# Patient Record
Sex: FEMALE | Race: WHITE | NOT HISPANIC OR LATINO | Employment: UNEMPLOYED | URBAN - METROPOLITAN AREA
[De-identification: names, ages, dates, MRNs, and addresses within clinical notes are randomized per-mention and may not be internally consistent; named-entity substitution may affect disease eponyms.]

---

## 2017-08-21 ENCOUNTER — OFFICE VISIT - HEALTHEAST (OUTPATIENT)
Dept: FAMILY MEDICINE | Facility: CLINIC | Age: 42
End: 2017-08-21

## 2017-08-21 ENCOUNTER — COMMUNICATION - HEALTHEAST (OUTPATIENT)
Dept: SCHEDULING | Facility: CLINIC | Age: 42
End: 2017-08-21

## 2017-08-21 DIAGNOSIS — R07.89 LEFT-SIDED CHEST WALL PAIN: ICD-10-CM

## 2017-08-21 DIAGNOSIS — M94.0 COSTOCHONDRITIS: ICD-10-CM

## 2017-08-21 DIAGNOSIS — S29.011A PECTORALIS MUSCLE STRAIN: ICD-10-CM

## 2017-08-22 LAB
ATRIAL RATE - MUSE: 79 BPM
DIASTOLIC BLOOD PRESSURE - MUSE: NORMAL MMHG
INTERPRETATION ECG - MUSE: NORMAL
P AXIS - MUSE: 60 DEGREES
PR INTERVAL - MUSE: 148 MS
QRS DURATION - MUSE: 90 MS
QT - MUSE: 402 MS
QTC - MUSE: 460 MS
R AXIS - MUSE: 16 DEGREES
SYSTOLIC BLOOD PRESSURE - MUSE: NORMAL MMHG
T AXIS - MUSE: 44 DEGREES
VENTRICULAR RATE- MUSE: 79 BPM

## 2017-10-11 ENCOUNTER — OFFICE VISIT - HEALTHEAST (OUTPATIENT)
Dept: FAMILY MEDICINE | Facility: CLINIC | Age: 42
End: 2017-10-11

## 2017-10-11 DIAGNOSIS — R05.9 COUGH: ICD-10-CM

## 2017-10-11 DIAGNOSIS — J32.9 SINUSITIS: ICD-10-CM

## 2017-10-11 DIAGNOSIS — J18.9 RIGHT MIDDLE LOBE PNEUMONIA: ICD-10-CM

## 2017-11-14 ENCOUNTER — OFFICE VISIT - HEALTHEAST (OUTPATIENT)
Dept: FAMILY MEDICINE | Facility: CLINIC | Age: 42
End: 2017-11-14

## 2017-11-14 DIAGNOSIS — R09.81 SINUS CONGESTION: ICD-10-CM

## 2017-11-14 DIAGNOSIS — R20.0 SADDLE ANESTHESIA: ICD-10-CM

## 2017-11-14 DIAGNOSIS — T14.8XXA MUSCLE STRAIN: ICD-10-CM

## 2017-11-14 DIAGNOSIS — R35.0 URINARY FREQUENCY: ICD-10-CM

## 2017-11-28 ENCOUNTER — OFFICE VISIT - HEALTHEAST (OUTPATIENT)
Dept: FAMILY MEDICINE | Facility: CLINIC | Age: 42
End: 2017-11-28

## 2017-11-28 DIAGNOSIS — E16.2 HYPOGLYCEMIA: ICD-10-CM

## 2017-11-28 DIAGNOSIS — Z32.00 POSSIBLE PREGNANCY: ICD-10-CM

## 2017-11-28 DIAGNOSIS — R11.15 EMESIS, PERSISTENT: ICD-10-CM

## 2018-04-25 ENCOUNTER — OFFICE VISIT - HEALTHEAST (OUTPATIENT)
Dept: FAMILY MEDICINE | Facility: CLINIC | Age: 43
End: 2018-04-25

## 2018-04-25 DIAGNOSIS — R42 LIGHTHEADED: ICD-10-CM

## 2018-04-25 DIAGNOSIS — R51.9 HEADACHE: ICD-10-CM

## 2018-04-25 DIAGNOSIS — R73.09 OTHER ABNORMAL GLUCOSE: ICD-10-CM

## 2018-04-25 DIAGNOSIS — R11.0 NAUSEA: ICD-10-CM

## 2018-04-25 DIAGNOSIS — R35.0 URINARY FREQUENCY: ICD-10-CM

## 2018-04-25 DIAGNOSIS — Z32.00 POSSIBLE PREGNANCY: ICD-10-CM

## 2018-04-25 LAB
ALBUMIN UR-MCNC: NEGATIVE MG/DL
ANION GAP SERPL CALCULATED.3IONS-SCNC: 10 MMOL/L (ref 5–18)
APPEARANCE UR: CLEAR
BASOPHILS # BLD AUTO: 0 THOU/UL (ref 0–0.2)
BASOPHILS NFR BLD AUTO: 0 % (ref 0–2)
BILIRUB UR QL STRIP: NEGATIVE
BUN SERPL-MCNC: 6 MG/DL (ref 8–22)
CHLORIDE BLD-SCNC: 109 MMOL/L (ref 98–107)
CO2 SERPL-SCNC: 20 MMOL/L (ref 22–31)
COLOR UR AUTO: YELLOW
CREAT SERPL-MCNC: 0.6 MG/DL (ref 0.7–1.3)
EOSINOPHIL # BLD AUTO: 0.2 THOU/UL (ref 0–0.4)
EOSINOPHIL NFR BLD AUTO: 2 % (ref 0–6)
ERYTHROCYTE [DISTWIDTH] IN BLOOD BY AUTOMATED COUNT: 14.3 % (ref 11–14.5)
GLUCOSE BLD-MCNC: 107 MG/DL (ref 70–125)
GLUCOSE UR STRIP-MCNC: NEGATIVE MG/DL
HBA1C MFR BLD: 5.4 % (ref 3.5–6)
HCG UR QL: NEGATIVE
HCT VFR BLD AUTO: 35.7 % (ref 35–47)
HGB BLD-MCNC: 11.8 G/DL (ref 12–16)
HGB UR QL STRIP: NEGATIVE
KETONES UR STRIP-MCNC: NEGATIVE MG/DL
LEUKOCYTE ESTERASE UR QL STRIP: NEGATIVE
LYMPHOCYTES # BLD AUTO: 1.8 THOU/UL (ref 0.8–4.4)
LYMPHOCYTES NFR BLD AUTO: 26 % (ref 20–40)
MCH RBC QN AUTO: 27.6 PG (ref 27–34)
MCHC RBC AUTO-ENTMCNC: 32.9 G/DL (ref 32–36)
MCV RBC AUTO: 84 FL (ref 80–100)
MONOCYTES # BLD AUTO: 0.3 THOU/UL (ref 0–0.9)
MONOCYTES NFR BLD AUTO: 5 % (ref 2–10)
NEUTROPHILS # BLD AUTO: 4.6 THOU/UL (ref 2–7.7)
NEUTROPHILS NFR BLD AUTO: 66 % (ref 50–70)
NITRATE UR QL: NEGATIVE
PH UR STRIP: 6 [PH] (ref 5–8)
PLATELET # BLD AUTO: 408 THOU/UL (ref 140–440)
PMV BLD AUTO: 7.7 FL (ref 7–10)
POTASSIUM BLD-SCNC: 3.3 MMOL/L (ref 3.5–5.5)
RBC # BLD AUTO: 4.27 MILL/UL (ref 3.8–5.4)
SODIUM SERPL-SCNC: 139 MMOL/L (ref 136–145)
SP GR UR STRIP: 1.01 (ref 1–1.03)
SP GR UR STRIP: <=1.005 (ref 1–1.03)
UROBILINOGEN UR STRIP-ACNC: NORMAL
WBC: 6.9 THOU/UL (ref 4–11)

## 2018-04-26 ENCOUNTER — OFFICE VISIT - HEALTHEAST (OUTPATIENT)
Dept: FAMILY MEDICINE | Facility: CLINIC | Age: 43
End: 2018-04-26

## 2018-04-26 DIAGNOSIS — E16.2 LOW BLOOD SUGAR: ICD-10-CM

## 2018-05-25 ENCOUNTER — OFFICE VISIT - HEALTHEAST (OUTPATIENT)
Dept: FAMILY MEDICINE | Facility: CLINIC | Age: 43
End: 2018-05-25

## 2018-05-25 DIAGNOSIS — L72.0 EPIDERMOID CYST OF SKIN: ICD-10-CM

## 2018-07-22 ENCOUNTER — OFFICE VISIT - HEALTHEAST (OUTPATIENT)
Dept: FAMILY MEDICINE | Facility: CLINIC | Age: 43
End: 2018-07-22

## 2018-07-22 DIAGNOSIS — S93.509A TOE SPRAIN, INITIAL ENCOUNTER: ICD-10-CM

## 2018-07-22 DIAGNOSIS — S99.922A FOOT INJURY, LEFT, INITIAL ENCOUNTER: ICD-10-CM

## 2018-07-24 ENCOUNTER — COMMUNICATION - HEALTHEAST (OUTPATIENT)
Dept: SCHEDULING | Facility: CLINIC | Age: 43
End: 2018-07-24

## 2018-08-16 ENCOUNTER — COMMUNICATION - HEALTHEAST (OUTPATIENT)
Dept: SCHEDULING | Facility: CLINIC | Age: 43
End: 2018-08-16

## 2018-08-21 ENCOUNTER — COMMUNICATION - HEALTHEAST (OUTPATIENT)
Dept: SCHEDULING | Facility: CLINIC | Age: 43
End: 2018-08-21

## 2018-08-26 ENCOUNTER — COMMUNICATION - HEALTHEAST (OUTPATIENT)
Dept: SCHEDULING | Facility: CLINIC | Age: 43
End: 2018-08-26

## 2018-08-31 ENCOUNTER — COMMUNICATION - HEALTHEAST (OUTPATIENT)
Dept: CARE COORDINATION | Facility: CLINIC | Age: 43
End: 2018-08-31

## 2018-08-31 ENCOUNTER — COMMUNICATION - HEALTHEAST (OUTPATIENT)
Dept: FAMILY MEDICINE | Facility: CLINIC | Age: 43
End: 2018-08-31

## 2018-08-31 ENCOUNTER — OFFICE VISIT - HEALTHEAST (OUTPATIENT)
Dept: FAMILY MEDICINE | Facility: CLINIC | Age: 43
End: 2018-08-31

## 2018-08-31 DIAGNOSIS — R51.9 INTRACTABLE HEADACHE: ICD-10-CM

## 2018-09-04 ENCOUNTER — COMMUNICATION - HEALTHEAST (OUTPATIENT)
Dept: CARE COORDINATION | Facility: CLINIC | Age: 43
End: 2018-09-04

## 2018-09-05 ENCOUNTER — AMBULATORY - HEALTHEAST (OUTPATIENT)
Dept: CARE COORDINATION | Facility: CLINIC | Age: 43
End: 2018-09-05

## 2018-09-05 DIAGNOSIS — R51.9 INTRACTABLE HEADACHE, UNSPECIFIED CHRONICITY PATTERN, UNSPECIFIED HEADACHE TYPE: ICD-10-CM

## 2018-09-05 DIAGNOSIS — G89.29 CHRONIC BACK PAIN: ICD-10-CM

## 2018-09-05 DIAGNOSIS — M54.9 CHRONIC BACK PAIN: ICD-10-CM

## 2018-09-06 ENCOUNTER — OFFICE VISIT - HEALTHEAST (OUTPATIENT)
Dept: FAMILY MEDICINE | Facility: CLINIC | Age: 43
End: 2018-09-06

## 2018-09-06 DIAGNOSIS — R51.9 HEADACHE: ICD-10-CM

## 2018-09-07 ENCOUNTER — COMMUNICATION - HEALTHEAST (OUTPATIENT)
Dept: FAMILY MEDICINE | Facility: CLINIC | Age: 43
End: 2018-09-07

## 2018-09-10 ENCOUNTER — COMMUNICATION - HEALTHEAST (OUTPATIENT)
Dept: SCHEDULING | Facility: CLINIC | Age: 43
End: 2018-09-10

## 2018-09-20 ENCOUNTER — COMMUNICATION - HEALTHEAST (OUTPATIENT)
Dept: PALLIATIVE MEDICINE | Facility: OTHER | Age: 43
End: 2018-09-20

## 2018-09-25 ENCOUNTER — COMMUNICATION - HEALTHEAST (OUTPATIENT)
Dept: NURSING | Facility: CLINIC | Age: 43
End: 2018-09-25

## 2018-10-09 ENCOUNTER — COMMUNICATION - HEALTHEAST (OUTPATIENT)
Dept: NURSING | Facility: CLINIC | Age: 43
End: 2018-10-09

## 2018-11-29 ENCOUNTER — OFFICE VISIT - HEALTHEAST (OUTPATIENT)
Dept: FAMILY MEDICINE | Facility: CLINIC | Age: 43
End: 2018-11-29

## 2018-11-29 DIAGNOSIS — H61.21 IMPACTED CERUMEN OF RIGHT EAR: ICD-10-CM

## 2018-11-29 DIAGNOSIS — R07.0 THROAT PAIN: ICD-10-CM

## 2018-11-29 DIAGNOSIS — F41.8 DEPRESSION WITH ANXIETY: ICD-10-CM

## 2018-11-29 LAB — DEPRECATED S PYO AG THROAT QL EIA: NORMAL

## 2018-11-29 ASSESSMENT — MIFFLIN-ST. JEOR: SCORE: 1839.77

## 2018-11-30 ENCOUNTER — COMMUNICATION - HEALTHEAST (OUTPATIENT)
Dept: LAB | Facility: CLINIC | Age: 43
End: 2018-11-30

## 2018-11-30 LAB — GROUP A STREP BY PCR: NORMAL

## 2018-12-01 ENCOUNTER — COMMUNICATION - HEALTHEAST (OUTPATIENT)
Dept: LAB | Facility: CLINIC | Age: 43
End: 2018-12-01

## 2018-12-03 ENCOUNTER — COMMUNICATION - HEALTHEAST (OUTPATIENT)
Dept: LAB | Facility: CLINIC | Age: 43
End: 2018-12-03

## 2018-12-03 ENCOUNTER — OFFICE VISIT - HEALTHEAST (OUTPATIENT)
Dept: INTERNAL MEDICINE | Facility: CLINIC | Age: 43
End: 2018-12-03

## 2018-12-03 DIAGNOSIS — F41.0 PANIC ATTACK: ICD-10-CM

## 2018-12-03 DIAGNOSIS — F41.9 ANXIETY: ICD-10-CM

## 2018-12-03 LAB
ALBUMIN SERPL-MCNC: 3.8 G/DL (ref 3.5–5)
ALP SERPL-CCNC: 80 U/L (ref 45–120)
ALT SERPL W P-5'-P-CCNC: 16 U/L (ref 0–45)
ANION GAP SERPL CALCULATED.3IONS-SCNC: 13 MMOL/L (ref 5–18)
AST SERPL W P-5'-P-CCNC: 14 U/L (ref 0–40)
BASOPHILS # BLD AUTO: 0 THOU/UL (ref 0–0.2)
BASOPHILS NFR BLD AUTO: 0 % (ref 0–2)
BILIRUB SERPL-MCNC: 0.2 MG/DL (ref 0–1)
BUN SERPL-MCNC: 13 MG/DL (ref 8–22)
CALCIUM SERPL-MCNC: 9.6 MG/DL (ref 8.5–10.5)
CHLORIDE BLD-SCNC: 110 MMOL/L (ref 98–107)
CO2 SERPL-SCNC: 18 MMOL/L (ref 22–31)
CREAT SERPL-MCNC: 0.72 MG/DL (ref 0.6–1.1)
EOSINOPHIL # BLD AUTO: 0.2 THOU/UL (ref 0–0.4)
EOSINOPHIL NFR BLD AUTO: 3 % (ref 0–6)
ERYTHROCYTE [DISTWIDTH] IN BLOOD BY AUTOMATED COUNT: 13.4 % (ref 11–14.5)
GFR SERPL CREATININE-BSD FRML MDRD: >60 ML/MIN/1.73M2
GLUCOSE BLD-MCNC: 81 MG/DL (ref 70–125)
HCT VFR BLD AUTO: 35.8 % (ref 35–47)
HGB BLD-MCNC: 11.7 G/DL (ref 12–16)
LYMPHOCYTES # BLD AUTO: 1.6 THOU/UL (ref 0.8–4.4)
LYMPHOCYTES NFR BLD AUTO: 23 % (ref 20–40)
MCH RBC QN AUTO: 27.4 PG (ref 27–34)
MCHC RBC AUTO-ENTMCNC: 32.7 G/DL (ref 32–36)
MCV RBC AUTO: 84 FL (ref 80–100)
MONOCYTES # BLD AUTO: 0.3 THOU/UL (ref 0–0.9)
MONOCYTES NFR BLD AUTO: 5 % (ref 2–10)
NEUTROPHILS # BLD AUTO: 4.7 THOU/UL (ref 2–7.7)
NEUTROPHILS NFR BLD AUTO: 69 % (ref 50–70)
PLATELET # BLD AUTO: 561 THOU/UL (ref 140–440)
PMV BLD AUTO: 7.2 FL (ref 7–10)
POTASSIUM BLD-SCNC: 4.2 MMOL/L (ref 3.5–5)
PROT SERPL-MCNC: 7.1 G/DL (ref 6–8)
RBC # BLD AUTO: 4.27 MILL/UL (ref 3.8–5.4)
SODIUM SERPL-SCNC: 141 MMOL/L (ref 136–145)
TSH SERPL DL<=0.005 MIU/L-ACNC: 0.76 UIU/ML (ref 0.3–5)
WBC: 6.8 THOU/UL (ref 4–11)

## 2018-12-04 ENCOUNTER — COMMUNICATION - HEALTHEAST (OUTPATIENT)
Dept: INTERNAL MEDICINE | Facility: CLINIC | Age: 43
End: 2018-12-04

## 2018-12-04 ENCOUNTER — OFFICE VISIT - HEALTHEAST (OUTPATIENT)
Dept: FAMILY MEDICINE | Facility: CLINIC | Age: 43
End: 2018-12-04

## 2018-12-04 DIAGNOSIS — Z23 NEED FOR VACCINATION: ICD-10-CM

## 2018-12-04 DIAGNOSIS — F41.9 ANXIETY: ICD-10-CM

## 2018-12-04 DIAGNOSIS — F32.1 CURRENT MODERATE EPISODE OF MAJOR DEPRESSIVE DISORDER WITHOUT PRIOR EPISODE (H): ICD-10-CM

## 2018-12-04 ASSESSMENT — MIFFLIN-ST. JEOR: SCORE: 1839.77

## 2018-12-05 ENCOUNTER — COMMUNICATION - HEALTHEAST (OUTPATIENT)
Dept: FAMILY MEDICINE | Facility: CLINIC | Age: 43
End: 2018-12-05

## 2018-12-06 ENCOUNTER — COMMUNICATION - HEALTHEAST (OUTPATIENT)
Dept: FAMILY MEDICINE | Facility: CLINIC | Age: 43
End: 2018-12-06

## 2018-12-06 DIAGNOSIS — Z76.0 ENCOUNTER FOR MEDICATION REFILL: ICD-10-CM

## 2018-12-06 DIAGNOSIS — F11.24 OPIOID DEPENDENCE WITH OPIOID-INDUCED MOOD DISORDER (H): ICD-10-CM

## 2018-12-13 ENCOUNTER — AMBULATORY - HEALTHEAST (OUTPATIENT)
Dept: FAMILY MEDICINE | Facility: CLINIC | Age: 43
End: 2018-12-13

## 2018-12-14 ENCOUNTER — OFFICE VISIT - HEALTHEAST (OUTPATIENT)
Dept: BEHAVIORAL HEALTH | Facility: HOSPITAL | Age: 43
End: 2018-12-14

## 2018-12-14 DIAGNOSIS — F43.23 ADJUSTMENT DISORDER WITH MIXED ANXIETY AND DEPRESSED MOOD: ICD-10-CM

## 2018-12-14 DIAGNOSIS — F41.1 GENERALIZED ANXIETY DISORDER WITH PANIC ATTACKS: ICD-10-CM

## 2018-12-14 DIAGNOSIS — F41.0 GENERALIZED ANXIETY DISORDER WITH PANIC ATTACKS: ICD-10-CM

## 2018-12-18 ENCOUNTER — OFFICE VISIT - HEALTHEAST (OUTPATIENT)
Dept: FAMILY MEDICINE | Facility: CLINIC | Age: 43
End: 2018-12-18

## 2018-12-18 DIAGNOSIS — F43.23 ADJUSTMENT DISORDER WITH MIXED ANXIETY AND DEPRESSED MOOD: ICD-10-CM

## 2018-12-18 DIAGNOSIS — F41.9 ANXIETY: ICD-10-CM

## 2018-12-18 DIAGNOSIS — F51.01 PRIMARY INSOMNIA: ICD-10-CM

## 2018-12-20 ENCOUNTER — OFFICE VISIT - HEALTHEAST (OUTPATIENT)
Dept: FAMILY MEDICINE | Facility: CLINIC | Age: 43
End: 2018-12-20

## 2018-12-20 ENCOUNTER — COMMUNICATION - HEALTHEAST (OUTPATIENT)
Dept: FAMILY MEDICINE | Facility: CLINIC | Age: 43
End: 2018-12-20

## 2018-12-20 DIAGNOSIS — F41.0 PANIC ATTACKS: ICD-10-CM

## 2018-12-20 DIAGNOSIS — F41.9 ANXIETY DISORDER, UNSPECIFIED TYPE: ICD-10-CM

## 2018-12-20 DIAGNOSIS — Z79.899 HIGH RISK MEDICATION USE: ICD-10-CM

## 2018-12-20 LAB
AMPHETAMINES UR QL SCN: ABNORMAL
BARBITURATES UR QL: ABNORMAL
BENZODIAZ UR QL: ABNORMAL
CANNABINOIDS UR QL SCN: ABNORMAL
COCAINE UR QL: ABNORMAL
CREAT UR-MCNC: 128.3 MG/DL
METHADONE UR QL SCN: ABNORMAL
OPIATES UR QL SCN: ABNORMAL
OXYCODONE UR QL: ABNORMAL
PCP UR QL SCN: ABNORMAL

## 2018-12-21 ENCOUNTER — COMMUNICATION - HEALTHEAST (OUTPATIENT)
Dept: FAMILY MEDICINE | Facility: CLINIC | Age: 43
End: 2018-12-21

## 2018-12-21 ENCOUNTER — COMMUNICATION - HEALTHEAST (OUTPATIENT)
Dept: NURSING | Facility: CLINIC | Age: 43
End: 2018-12-21

## 2018-12-26 ENCOUNTER — OFFICE VISIT - HEALTHEAST (OUTPATIENT)
Dept: FAMILY MEDICINE | Facility: CLINIC | Age: 43
End: 2018-12-26

## 2018-12-26 DIAGNOSIS — F32.2 SEVERE MAJOR DEPRESSIVE DISORDER (H): ICD-10-CM

## 2018-12-26 DIAGNOSIS — F41.9 ANXIETY: ICD-10-CM

## 2018-12-26 DIAGNOSIS — Z79.899 HIGH RISK MEDICATION USE: ICD-10-CM

## 2018-12-27 ENCOUNTER — COMMUNICATION - HEALTHEAST (OUTPATIENT)
Dept: NURSING | Facility: CLINIC | Age: 43
End: 2018-12-27

## 2019-01-02 ENCOUNTER — OFFICE VISIT - HEALTHEAST (OUTPATIENT)
Dept: FAMILY MEDICINE | Facility: CLINIC | Age: 44
End: 2019-01-02

## 2019-01-02 ENCOUNTER — OFFICE VISIT - HEALTHEAST (OUTPATIENT)
Dept: BEHAVIORAL HEALTH | Facility: HOSPITAL | Age: 44
End: 2019-01-02

## 2019-01-02 ENCOUNTER — COMMUNICATION - HEALTHEAST (OUTPATIENT)
Dept: NURSING | Facility: CLINIC | Age: 44
End: 2019-01-02

## 2019-01-02 ENCOUNTER — COMMUNICATION - HEALTHEAST (OUTPATIENT)
Dept: FAMILY MEDICINE | Facility: CLINIC | Age: 44
End: 2019-01-02

## 2019-01-02 DIAGNOSIS — T74.91XD DOMESTIC VIOLENCE OF ADULT, SUBSEQUENT ENCOUNTER: ICD-10-CM

## 2019-01-02 DIAGNOSIS — F41.1 GENERALIZED ANXIETY DISORDER WITH PANIC ATTACKS: ICD-10-CM

## 2019-01-02 DIAGNOSIS — F41.0 GENERALIZED ANXIETY DISORDER WITH PANIC ATTACKS: ICD-10-CM

## 2019-01-02 DIAGNOSIS — Z79.899 CONTROLLED SUBSTANCE AGREEMENT SIGNED: ICD-10-CM

## 2019-01-02 DIAGNOSIS — F43.23 ADJUSTMENT DISORDER WITH MIXED ANXIETY AND DEPRESSED MOOD: ICD-10-CM

## 2019-01-02 DIAGNOSIS — F41.9 ANXIETY: ICD-10-CM

## 2019-01-02 LAB
AMPHETAMINES UR QL SCN: ABNORMAL
BARBITURATES UR QL: ABNORMAL
BENZODIAZ UR QL: ABNORMAL
CANNABINOIDS UR QL SCN: ABNORMAL
COCAINE UR QL: ABNORMAL
CREAT UR-MCNC: 95.3 MG/DL
METHADONE UR QL SCN: ABNORMAL
OPIATES UR QL SCN: ABNORMAL
OXYCODONE UR QL: ABNORMAL
PCP UR QL SCN: ABNORMAL

## 2019-01-06 ENCOUNTER — COMMUNICATION - HEALTHEAST (OUTPATIENT)
Dept: SCHEDULING | Facility: CLINIC | Age: 44
End: 2019-01-06

## 2019-01-11 ENCOUNTER — AMBULATORY - HEALTHEAST (OUTPATIENT)
Dept: BEHAVIORAL HEALTH | Facility: HOSPITAL | Age: 44
End: 2019-01-11

## 2019-01-11 ENCOUNTER — OFFICE VISIT - HEALTHEAST (OUTPATIENT)
Dept: BEHAVIORAL HEALTH | Facility: HOSPITAL | Age: 44
End: 2019-01-11

## 2019-01-11 DIAGNOSIS — F41.0 GENERALIZED ANXIETY DISORDER WITH PANIC ATTACKS: ICD-10-CM

## 2019-01-11 DIAGNOSIS — F41.1 GENERALIZED ANXIETY DISORDER WITH PANIC ATTACKS: ICD-10-CM

## 2019-01-11 DIAGNOSIS — F43.23 ADJUSTMENT DISORDER WITH MIXED ANXIETY AND DEPRESSED MOOD: ICD-10-CM

## 2019-01-11 DIAGNOSIS — F43.22 ADJUSTMENT DISORDER WITH ANXIOUS MOOD: ICD-10-CM

## 2019-01-14 ENCOUNTER — COMMUNICATION - HEALTHEAST (OUTPATIENT)
Dept: FAMILY MEDICINE | Facility: CLINIC | Age: 44
End: 2019-01-14

## 2019-01-14 ENCOUNTER — OFFICE VISIT - HEALTHEAST (OUTPATIENT)
Dept: FAMILY MEDICINE | Facility: CLINIC | Age: 44
End: 2019-01-14

## 2019-01-14 DIAGNOSIS — F41.9 ANXIETY: ICD-10-CM

## 2019-01-14 DIAGNOSIS — J40 BRONCHITIS: ICD-10-CM

## 2019-01-14 DIAGNOSIS — F43.23 ADJUSTMENT DISORDER WITH MIXED ANXIETY AND DEPRESSED MOOD: ICD-10-CM

## 2019-01-14 DIAGNOSIS — J01.10 ACUTE NON-RECURRENT FRONTAL SINUSITIS: ICD-10-CM

## 2019-01-16 ENCOUNTER — AMBULATORY - HEALTHEAST (OUTPATIENT)
Dept: BEHAVIORAL HEALTH | Facility: HOSPITAL | Age: 44
End: 2019-01-16

## 2019-01-18 ENCOUNTER — COMMUNICATION - HEALTHEAST (OUTPATIENT)
Dept: NURSING | Facility: CLINIC | Age: 44
End: 2019-01-18

## 2019-01-21 ENCOUNTER — COMMUNICATION - HEALTHEAST (OUTPATIENT)
Dept: FAMILY MEDICINE | Facility: CLINIC | Age: 44
End: 2019-01-21

## 2019-01-21 ENCOUNTER — COMMUNICATION - HEALTHEAST (OUTPATIENT)
Dept: SCHEDULING | Facility: CLINIC | Age: 44
End: 2019-01-21

## 2019-01-21 DIAGNOSIS — F41.9 ANXIETY: ICD-10-CM

## 2019-01-21 DIAGNOSIS — F43.23 ADJUSTMENT DISORDER WITH MIXED ANXIETY AND DEPRESSED MOOD: ICD-10-CM

## 2019-01-21 DIAGNOSIS — J40 BRONCHITIS: ICD-10-CM

## 2019-01-22 ENCOUNTER — COMMUNICATION - HEALTHEAST (OUTPATIENT)
Dept: SCHEDULING | Facility: CLINIC | Age: 44
End: 2019-01-22

## 2019-01-22 ENCOUNTER — COMMUNICATION - HEALTHEAST (OUTPATIENT)
Dept: FAMILY MEDICINE | Facility: CLINIC | Age: 44
End: 2019-01-22

## 2019-01-24 ENCOUNTER — AMBULATORY - HEALTHEAST (OUTPATIENT)
Dept: BEHAVIORAL HEALTH | Facility: HOSPITAL | Age: 44
End: 2019-01-24

## 2019-01-25 ENCOUNTER — COMMUNICATION - HEALTHEAST (OUTPATIENT)
Dept: FAMILY MEDICINE | Facility: CLINIC | Age: 44
End: 2019-01-25

## 2019-01-30 ENCOUNTER — COMMUNICATION - HEALTHEAST (OUTPATIENT)
Dept: FAMILY MEDICINE | Facility: CLINIC | Age: 44
End: 2019-01-30

## 2019-01-30 DIAGNOSIS — F43.23 ADJUSTMENT DISORDER WITH MIXED ANXIETY AND DEPRESSED MOOD: ICD-10-CM

## 2019-01-30 DIAGNOSIS — F41.9 ANXIETY: ICD-10-CM

## 2019-01-31 ENCOUNTER — COMMUNICATION - HEALTHEAST (OUTPATIENT)
Dept: FAMILY MEDICINE | Facility: CLINIC | Age: 44
End: 2019-01-31

## 2019-01-31 ENCOUNTER — AMBULATORY - HEALTHEAST (OUTPATIENT)
Dept: BEHAVIORAL HEALTH | Facility: HOSPITAL | Age: 44
End: 2019-01-31

## 2019-01-31 ENCOUNTER — AMBULATORY - HEALTHEAST (OUTPATIENT)
Dept: CARE COORDINATION | Facility: CLINIC | Age: 44
End: 2019-01-31

## 2019-02-01 ENCOUNTER — AMBULATORY - HEALTHEAST (OUTPATIENT)
Dept: BEHAVIORAL HEALTH | Facility: CLINIC | Age: 44
End: 2019-02-01

## 2019-02-01 DIAGNOSIS — F13.20 SEVERE BENZODIAZEPINE USE DISORDER (H): ICD-10-CM

## 2019-02-01 DIAGNOSIS — Z76.5 DRUG-SEEKING BEHAVIOR: ICD-10-CM

## 2019-02-01 DIAGNOSIS — Z79.899 HIGH RISK MEDICATION USE: ICD-10-CM

## 2019-02-01 DIAGNOSIS — F31.9 BIPOLAR I DISORDER (H): ICD-10-CM

## 2019-02-04 ENCOUNTER — COMMUNICATION - HEALTHEAST (OUTPATIENT)
Dept: BEHAVIORAL HEALTH | Facility: CLINIC | Age: 44
End: 2019-02-04

## 2019-02-05 ENCOUNTER — COMMUNICATION - HEALTHEAST (OUTPATIENT)
Dept: NURSING | Facility: CLINIC | Age: 44
End: 2019-02-05

## 2019-02-14 ENCOUNTER — AMBULATORY - HEALTHEAST (OUTPATIENT)
Dept: BEHAVIORAL HEALTH | Facility: HOSPITAL | Age: 44
End: 2019-02-14

## 2019-02-21 ENCOUNTER — COMMUNICATION - HEALTHEAST (OUTPATIENT)
Dept: FAMILY MEDICINE | Facility: CLINIC | Age: 44
End: 2019-02-21

## 2019-02-21 DIAGNOSIS — F41.9 ANXIETY: ICD-10-CM

## 2019-02-21 DIAGNOSIS — F43.23 ADJUSTMENT DISORDER WITH MIXED ANXIETY AND DEPRESSED MOOD: ICD-10-CM

## 2019-03-27 ENCOUNTER — COMMUNICATION - HEALTHEAST (OUTPATIENT)
Dept: FAMILY MEDICINE | Facility: CLINIC | Age: 44
End: 2019-03-27

## 2019-03-27 DIAGNOSIS — F43.23 ADJUSTMENT DISORDER WITH MIXED ANXIETY AND DEPRESSED MOOD: ICD-10-CM

## 2019-03-27 DIAGNOSIS — F41.9 ANXIETY: ICD-10-CM

## 2019-04-29 ENCOUNTER — COMMUNICATION - HEALTHEAST (OUTPATIENT)
Dept: FAMILY MEDICINE | Facility: CLINIC | Age: 44
End: 2019-04-29

## 2019-04-29 DIAGNOSIS — F41.9 ANXIETY: ICD-10-CM

## 2019-04-29 DIAGNOSIS — F43.23 ADJUSTMENT DISORDER WITH MIXED ANXIETY AND DEPRESSED MOOD: ICD-10-CM

## 2019-07-22 ENCOUNTER — COMMUNICATION - HEALTHEAST (OUTPATIENT)
Dept: FAMILY MEDICINE | Facility: CLINIC | Age: 44
End: 2019-07-22

## 2019-07-22 DIAGNOSIS — F41.9 ANXIETY: ICD-10-CM

## 2019-07-22 DIAGNOSIS — F43.23 ADJUSTMENT DISORDER WITH MIXED ANXIETY AND DEPRESSED MOOD: ICD-10-CM

## 2021-05-27 NOTE — TELEPHONE ENCOUNTER
Controlled Substance Refill Request  Medication:   Requested Prescriptions     Pending Prescriptions Disp Refills     ALPRAZolam (XANAX) 0.5 MG tablet [Pharmacy Med Name: ALPRAZOLAM 0.5MG TABLETS] 30 tablet 0     Sig: TAKE 1 TABLET(0.5 MG) BY MOUTH THREE TIMES DAILY AS NEEDED FOR SLEEP OR ANXIETY     Date Last Fill: 3/1/19  Pharmacy: [id number]   Submit electronically to pharmacy  Controlled Substance Agreement on File:   Encounter-Level CSA Scan Date:    There are no encounter-level csa scan date.       Last office visit: Last office visit pertaining to requested medication was 1/2/19.

## 2021-05-28 NOTE — TELEPHONE ENCOUNTER
Controlled Substance Refill Request  Medication:   Requested Prescriptions     Pending Prescriptions Disp Refills     ALPRAZolam (XANAX) 0.5 MG tablet [Pharmacy Med Name: ALPRAZOLAM 0.5MG TABLETS] 30 tablet 0     Sig: TAKE 1 TABLET(0.5 MG) BY MOUTH THREE TIMES DAILY AS NEEDED FOR SLEEP OR ANXIETY     Date Last Fill: 3/29/19  [facility]    Submit electronically to pharmacy  Controlled Substance Agreement on File:   Encounter-Level CSA Scan Date:    There are no encounter-level csa scan date.       Last office visit: Last office visit pertaining to requested medication was 1/2/19.

## 2021-05-30 NOTE — TELEPHONE ENCOUNTER
Patient stated she has moved and has not been able to establish care with a new PCP. Patient stated her mother just passed away and she going through a tough time right now. Patient is hoping she can get a refill without coming in to be seen.    Controlled Substance Refill Request  Medication Name:   Requested Prescriptions     Pending Prescriptions Disp Refills     ALPRAZolam (XANAX) 0.5 MG tablet 30 tablet 0     Sig: TAKE 1 TABLET(0.5 MG) BY MOUTH THREE TIMES DAILY AS NEEDED FOR SLEEP OR ANXIETY     Date Last Fill: 4/30/19  Pharmacy: [facility]      Submit electronically to pharmacy  Controlled Substance Agreement Date Scanned:   Encounter-Level CSA Scan Date:    There are no encounter-level csa scan date.       Last office visit with prescriber/PCP: 1/2/2019 Desirae Major FNP OR jenn dept: 1/2/2019 Desirae Major FNP OR same specialty: 1/2/2019 Desirae Major FNP  Last physical: Visit date not found Last MTM visit: Visit date not found

## 2021-05-30 NOTE — TELEPHONE ENCOUNTER
No longer under our care-gave extended refill previously. Sorry but will not continue to fill controlled substance.

## 2021-05-30 NOTE — TELEPHONE ENCOUNTER
Who is calling:  Patient  Reason for Call:  Patient called stating she is scheduled on 8/16/19 with Dr Goldsmith at [facility] in [place] but will need medication before then.  Date of last appointment with primary care: 1/2/19  Okay to leave a detailed message: Yes

## 2021-05-31 ENCOUNTER — RECORDS - HEALTHEAST (OUTPATIENT)
Dept: ADMINISTRATIVE | Facility: CLINIC | Age: 46
End: 2021-05-31

## 2021-05-31 VITALS — WEIGHT: 273 LBS | BODY MASS INDEX: 42.75 KG/M2

## 2021-05-31 VITALS — BODY MASS INDEX: 41.96 KG/M2 | WEIGHT: 268 LBS

## 2021-05-31 VITALS — BODY MASS INDEX: 42.76 KG/M2 | WEIGHT: 273 LBS

## 2021-05-31 VITALS — BODY MASS INDEX: 42.22 KG/M2 | WEIGHT: 269.6 LBS

## 2021-06-01 VITALS — BODY MASS INDEX: 39.42 KG/M2 | WEIGHT: 251.7 LBS

## 2021-06-01 VITALS — BODY MASS INDEX: 41.5 KG/M2 | WEIGHT: 265 LBS

## 2021-06-01 VITALS — BODY MASS INDEX: 41.19 KG/M2 | WEIGHT: 263 LBS

## 2021-06-01 VITALS — WEIGHT: 265 LBS | BODY MASS INDEX: 41.5 KG/M2

## 2021-06-02 ENCOUNTER — RECORDS - HEALTHEAST (OUTPATIENT)
Dept: ADMINISTRATIVE | Facility: CLINIC | Age: 46
End: 2021-06-02

## 2021-06-02 VITALS — WEIGHT: 250.6 LBS | BODY MASS INDEX: 39.25 KG/M2

## 2021-06-02 VITALS — WEIGHT: 256.4 LBS | BODY MASS INDEX: 40.16 KG/M2

## 2021-06-02 VITALS — WEIGHT: 253 LBS | BODY MASS INDEX: 39.63 KG/M2

## 2021-06-02 VITALS — BODY MASS INDEX: 39.87 KG/M2 | WEIGHT: 254 LBS | HEIGHT: 67 IN

## 2021-06-02 VITALS — BODY MASS INDEX: 39.47 KG/M2 | WEIGHT: 252 LBS

## 2021-06-02 VITALS — WEIGHT: 249.5 LBS | BODY MASS INDEX: 39.08 KG/M2

## 2021-06-02 VITALS — BODY MASS INDEX: 39.05 KG/M2 | WEIGHT: 249.3 LBS

## 2021-06-02 VITALS — WEIGHT: 251 LBS | BODY MASS INDEX: 39.31 KG/M2

## 2021-06-02 VITALS — WEIGHT: 253.1 LBS | BODY MASS INDEX: 39.64 KG/M2

## 2021-06-02 VITALS — HEIGHT: 67 IN | WEIGHT: 254 LBS | BODY MASS INDEX: 39.87 KG/M2

## 2021-06-12 NOTE — PROGRESS NOTES
ASSESSMENT/PLAN:   1. Pectoralis muscle strain  predniSONE (DELTASONE) 20 MG tablet   2. Left-sided chest wall pain  Electrocardiogram Perform - Clinic    XR Ribs Left W PA Chest    Electrocardiogram Perform and Read   3. Costochondritis         I highly suspect musculoskeletal etiology of chest pain given that is completely reproducible upon examination and she has a history of inciting event (picking up her daughter).  Given the significant amount of pain that she is having, got dedicated rib films to ensure no rib fracture, as well as chest x-ray to ensure pneumonia has not developed secondary to splinting with breathing.  Rib x-rays were negative, and chest x-ray showed no infiltrate.    Given that it is left-sided chest pain, I did get a EKG.  This returned showing normal sinus rhythm, no significant change from previous on 4/18/2017.  Official cardiology read is pending. Do not suspect aortic dissection given normal vitals and duration of symptoms.  Do not suspect pulmonary embolism given no shortness of breath, hypoxia, or tachycardia.  Do not suspect any other acute cardiopulmonary process.  Discussed with patient likely muscular etiology-she may have partially torn part of her pectoralis muscle, however I have high suspicion for muscle strain.  She may have a component of costochondritis as well given tenderness along sternal border. Recommended supportive cares. Due to her NSAID allergy, I prescribed a short course of prednisone. Discussed side effects of this medication. Discussed red flags for immediate return to clinic/ER, as well as indications for follow up if no improvement. Patient understood and agreed to plan. Patient was stable for discharge.      Patient Instructions:  Patient Instructions   Your EKG was normal.  Your chest x-ray did not show any pneumonia.  There were no rib fractures on your x-rays either.    Most likely your pain is due to a strained or partially torn muscle.  There is no  specific treatment for this.  It will heal on its own with time.  It may take 4-6 weeks.    You should rest, use ice or heat therapy, and take anti-inflammatory medications to help with the pain.    Take prednisone 40mg once daily with food x 4 days.  No NSAIDs while on this medication.  For breakthrough pain, please add on Tylenol up to every 6 hours as needed.    Practice gentle stretching exercises, however avoid activities that cause pain.    Follow-up with your primary care provider if no improvement in symptoms in 3-4 weeks, sooner if worsening.    If developing fever, increasing cough or sputum production, shortness of breath, or any other new, concerning symptoms, go to the ER immediately.                    SUBJECTIVE:   Amena Renteria is a 41 y.o. female with a history significant for chronic pain, obesity, nicotine use, GERD, who presents today for evaluation of left-sided chest wall pain after heavy lifting 10 days ago. She picked up her 58lb daughter. She thought she pulled something when she developed the pain 2 hours later.  It is getting worse. Pain now radiates under the left arm. It is tender to push on it and when she lays on the left side. She also admits that it hurts to laugh, sneeze, cough, or deep breathe. It is worse when she hangs her left arm down or lifts with the left arm. She says her left hand fingers feel tickly when arm is extended, but not when her arm is flexed at her side.   No shortness of breath, cough, fever, lightheadedness.  No bruising noted. No swelling.   No PMH injuries to the chest or left arm. She did have gastric bypass surgery and lumbar spinal fusion years ago.  She has been using ice, heat, ibuprofen, ASA, and Tylenol, but nothing is helping.        Past Medical History:  Patient Active Problem List   Diagnosis     Spinal Adhesive Arachnoiditis     Opioid Withdrawal     Sacroiliitis     Lumbar Radiculopathy     Anemia     Opioid Dependence With Continuous Use      Restless Legs Syndrome     Lower Back Pain Chronic     Chronic Pain     Sciatica     Nonepileptic Seizures     Lumbar Spondylosis     Fatigue     Nicotine Dependence     Esophageal Reflux     Chest Pain     Nephrolithiasis     Hearing Loss     Peripheral Neuropathy     Vitamin B12 Deficiency     Hypoglycemia     Insomnia     Anxiety disorder       Surgical History:  Past Surgical History:   Procedure Laterality Date     APPENDECTOMY        SECTION       CHOLECYSTECTOMY       IN ARTHRODESIS ANT INTERBODY MIN DISCECTOMY,LUMBAR      Description: Lumbar Vertebral Fusion;  Recorded: 2012;  Annotations: Lumbar Fusion L3-L5 in .     IN CYSTO/URETERO/PYELOSCOPY, DX      Description: Cystoscopy With Ureteroscopy;  Recorded: 2012;  Annotations: stenting in  for nephrolithiasis.     IN GASTRIC BYPASS,OBESE<150CM CASSIA-EN-Y      Description: Gastric Surgery For Morbid Obesity Bypass With Cassia-en-Y;  Recorded: 2013;     IN VARGAS W/O FACETEC FORAMOT/DSKC  VRT SEG, CERVICAL      Description: Laminectomy Lumbar;  Recorded: 2012;  Annotations: L5-S1 lami in , and again in  when she re-injured her back.     IN TMJ ARTHROSCOPY/DIAGNOSTIC      Description: Arthroscopy Temporomandibular Joint;  Proc Date: 2003;           Family History:  Family History   Problem Relation Age of Onset     Breast cancer Mother      Heart attack Father      Hypertension Father      Hyperlipidemia Father      Reviewed; Non-contributory      Social History:    History   Smoking Status     Current Every Day Smoker     Packs/day: 0.50     Types: Cigarettes   Smokeless Tobacco     Never Used     Smoking: yes, 2-3 cigarettes/day  Alcohol use: none  Other drug use: none  Occupation: social security disability        Current Medications:  Current Outpatient Prescriptions on File Prior to Visit   Medication Sig Dispense Refill     bisacodyl (DULCOLAX) 10 mg suppository Insert 10 mg into the rectum.        albuterol (PROVENTIL HFA;VENTOLIN HFA) 90 mcg/actuation inhaler Inhale 2 puffs every 4 (four) hours as needed for wheezing or shortness of breath (or cough). 18 g 1     albuterol (PROVENTIL) 2.5 mg /3 mL (0.083 %) nebulizer solution Take 3 mL (2.5 mg total) by nebulization every 4 (four) hours as needed for wheezing or shortness of breath (or cough). 30 vial 1     busPIRone (BUSPAR) 30 MG tablet Take 30 mg by mouth 2 (two) times a day.       gabapentin (NEURONTIN) 600 MG tablet Take 600 mg by mouth 3 (three) times a day.       lurasidone 80 mg Tab Take 60 mg by mouth.        methadone (DOLOPHINE) 10 mg/5 mL solution Take 130 mg by mouth daily.       omeprazole (PRILOSEC) 40 MG capsule Take 40 mg by mouth daily.       sucralfate (CARAFATE) 100 mg/mL suspension Take 500 mg by mouth 4 (four) times a day.       vilazodone (VIIBRYD) 40 mg Tab tablet Take 40 mg by mouth.       zolpidem (AMBIEN CR) 12.5 MG CR tablet Take 12.5 mg by mouth.       No current facility-administered medications on file prior to visit.        Allergies:   Allergies   Allergen Reactions     Ketorolac Anaphylaxis     Annotation: Throat swollen       Clindamycin Unknown     Stomach pains     Iron Unknown     Other reaction(s): Other, see comments  Pt states having numb lips and itching from IV Iron     Lithium Carbonate      Loperamide      Nsaids (Non-Steroidal Anti-Inflammatory Drug)      Oxycodone Hives     Ropinirole      Tramadol      Codeine Nausea And Vomiting and Rash     States she can take codeine now as of 8/21/17     Lomotil  [Diphenoxylate-Atropine] Rash     Sumatriptan Headache and Palpitations     Severe headache       I personally reviewed patient's past medical, surgical, social, family history and allergies.    ROS:  Review of Systems   Constitutional: Negative for fever.   Respiratory: Negative for cough and shortness of breath.    Cardiovascular: Positive for chest pain.   Musculoskeletal: Positive for arthralgias and myalgias.    Skin: Negative for color change.   Neurological: Negative for light-headedness.           OBJECTIVE:   Vitals:    08/21/17 1844   BP: 122/78   Patient Site: Right Arm   Patient Position: Sitting   Cuff Size: Adult Large   Pulse: 91   Resp: 16   Temp: 98.3  F (36.8  C)   TempSrc: Oral   SpO2: 99%   Weight: (!) 268 lb (121.6 kg)       General Appearance:  Alert, well-appearing female in NAD. Afebrile.    Integument: no diaphoresis.  Neck: Supple  Respiratory: No distress. Equal air entry to bilateral bases. Lungs clear to ausculation bilaterally. No crackles, wheezes, rhonchi or stridor.  Cardiovascular: Regular rate and rhythm, no murmur, rub or gallop. No obvious chest wall deformities. Peripheral pulses 2+ bilaterally.   Musculoskeletal: Chest wall is tender to palpation along left sternal border, left anterior ribs in the 5-8 region. No clavicular, shoulder, humerus tenderness.  Left arm AROM: decreased shoulder abduction - abducts to 90 degrees but with pain.  Strength: resisted internal rotation 3/5 left, 5/5 right. Resisted external rotation 5/5 bilaterally. Shoulder abduction 3/5 left, 5/5 right. Forward flexion 4/5 left, 5/5 right. Elbow flexion/extension and  strength 5/5 bilaterally.  Neurologic: Alert and orientated appropriately. No focal deficits.            Radiology:  I personally ordered and viewed this study. I agree with below radiology findings.    Xr Ribs Left W Pa Chest    Result Date: 8/21/2017  XR RIBS LEFT W PA CHEST 8/21/2017 7:15 PM INDICATION: left rib pain after injury COMPARISON: NONE. FINDINGS: Negative chest x-ray. The left rib detailed films are negative with nothing to suggest fractures. This report was electronically interpreted by: Dr. Devon Costello MD ON 08/21/2017 at 19:22        Laboratory Studies:  I personally ordered and interpreted these studies.    Results for orders placed or performed in visit on 08/21/17   Electrocardiogram Perform and Read   Result Value Ref Range     SYSTOLIC BLOOD PRESSURE  mmHg    DIASTOLIC BLOOD PRESSURE  mmHg    VENTRICULAR RATE 77 BPM    ATRIAL RATE 77 BPM    P-R INTERVAL 154 ms    QRS DURATION 94 ms    Q-T INTERVAL 398 ms    QTC CALCULATION (BEZET) 450 ms    P Axis 56 degrees    R AXIS 16 degrees    T AXIS 39 degrees    MUSE DIAGNOSIS       Normal sinus rhythm  Normal ECG  When compared with ECG of 18-APR-2017 17:35,  No significant change was found

## 2021-06-14 NOTE — PROGRESS NOTES
Assessment & Plan:      Amena was seen today for ear pain, flank pain and possible pregnancy.    Diagnoses and all orders for this visit:    Urinary frequency  -     Urinalysis-UC if Indicated  -     Pregnancy (Beta-hCG, Qual), Urine  -     Patient denied abdominal CT without contrast to look for nephrolithiasis    Sinus congestion  -     fluticasone (FLONASE) 50 mcg/actuation nasal spray; 2 sprays into each nostril daily for 4 days.  -     Sudafed     Muscle strain        -     Tylenol every 6-8 hours        -     Rest, Ice/Heat pads, light stretching    Saddle anesthesia        -     Sent to the ED for further work-up - concern for cauda equina syndrome    Spoke with provider, Dr. Mercer, from [facility] over the phone. They recommended the patient be transferred for further work-up. Confirmed plan with the patient, and they agreed with plan. Answered all questions.      Patient Instructions   Be seen in the emergency room for further work-up of groin numbness and leg pain.    You were seen today for sinus congestion and/or pain. This is likely due to a viral illness.    Symptoms management:  - May use Tylenol for discomfort and/or fever if present  - May try saline irrigation to relieve congestion (see instructions below)  - Use of nasal steroids as prescribed  - If you are experiencing ear fullness, may try an oral decongestant such as sudafed    Reasons to come back for re-evaluation:  - Develop a fever of 102F (38.9C)  - Sudden and severe pain in the face and head  - Troubles seeing or double vision  - Swelling or redness around one or both eyes  - Sfiff neck  - Symptoms have not improved after 7 days    Buffered normal saline nasal irrigation   The benefits   1. Saline (saltwater) washes the mucus and irritants from your nose.   2. The sinus passages are moisturized.   3. Studies have also shown that a nasal irrigation improves cell function (the cells that move the mucus work better).   The recipe   Use a  one-quart glass jar that is thoroughly cleansed.   You may use a large medical syringe (30 cc), water pick with an irrigation tip (preferred method), squeeze bottle, or Neti pot. Do not use a baby bulb syringe. The syringe or pick should be sterilized frequently or replaced every two to three weeks to avoid contamination and infection.   Fill with water that has been distilled, previously boiled, or otherwise sterilized. Plain tap water is not recommended, because it is not necessarily sterile.   Add 1 to 1  heaping teaspoons of pickling/kaylan salt. Do not use table salt, because it contains a large number of additives.   Add 1 teaspoon of baking soda (pure bicarbonate).   Mix ingredients together, and store at room temperature. Discard after one week.   You may also make up a solution from premixed packets that are commercially prepared specifically for nasal irrigation.   The instructions   Irrigate your nose with saline one to two times per day.   If you have been told to use nasal medication, you should always use your saline solution first. The nasal medication is much more effective when sprayed onto clean nasal membranes, and the spray will reach deeper into the nose.   Pour the amount of fluid you plan to use into a clean bowl. Do not put your used syringe back into the storage container, because it contaminates your solution.   You may warm the solution slightly in the microwave, but be sure that the solution is not hot.   Bend over the sink (some people do this in the shower) and squirt the solution into each side of your nose, aiming the stream toward the back of your head, not the top of your head. The solution should flow into one nostril and out of the other, but it will not harm you if you swallow a little.   Some people experience a little burning sensation the first few times they use buffered saline solution, but this usually goes away after they adapt to it.     You were seen today for a muscle  strain.    Symptom management:  - May use Tylenol for discomfort  - Ice/heat pads as tolerated  - Rest with occasional light stretching    Reasons to be seen immediately in the emergency room:  - Develop numbness or tingling in the groin or legs  - Sharp shooting pain down to your legs  - Loss of bowel or bladder function    Otherwise, if pain is not improving after 5 days, return for re-evaluation or see your primary care provider.         Subjective:      Amena Renteria is a 41 y.o. female with a history of recently recovering from pneumonia, lower back injury, and nephrolithiasis who complains of ear pain, possible pregnancy, and urinary frequency. She has had urinary symptoms for 2 weeks with acute worsening over the last 2 days. Patient also complains of back pain worse on the left and possible pregnancy given LMP was 10/1/17. Patient denies hematuria, dysuria, fever, ear drainage, and vomiting. Patient does not have a history of recurrent UTI. Patient does have a history of pyelonephritis.    The patient's ear pain started 3 days ago. Bilateral. Describes as pressure with popping sensation. Also notes headaches and sinus pressure with purulent discharge.    At the end of the exam while discussing care of a muscle strain, patient admitted to signs of possible cauda equina syndrome including a single episode of loss of bowel function 1 week ago, saddle anesthesia, and left leg weakness that has progressively getting worse over the last week. Has a history of lower back injury from one year ago when the patient fell down 20 stairs. Has a back stimulator installed for chronic back pain.     The following portions of the patient's history were reviewed and updated as appropriate: allergies, current medications and problem list.    Review of Systems  Pertinent items are noted in HPI.    Allergies  Allergies   Allergen Reactions     Ketorolac Anaphylaxis     Annotation: Throat swollen       Clindamycin Unknown      Stomach pains     Iron Unknown     Other reaction(s): Other, see comments  Pt states having numb lips and itching from IV Iron     Lithium Carbonate      Loperamide      Nsaids (Non-Steroidal Anti-Inflammatory Drug)      Oxycodone Hives     Ropinirole      Tramadol      Codeine Nausea And Vomiting and Rash     States she can take codeine now as of 8/21/17     Lomotil  [Diphenoxylate-Atropine] Rash     Sumatriptan Headache and Palpitations     Severe headache         Objective:      /74  Pulse (!) 111  Temp 97.5  F (36.4  C) (Oral)   Resp 16  Wt (!) 273 lb (123.8 kg)  LMP 09/28/2017 (Exact Date)  SpO2 98%  Breastfeeding? No  BMI 42.76 kg/m2  General appearance: alert, appears stated age, cooperative and no distress  Head: Normocephalic, without obvious abnormality, atraumatic, sinuses tender to percussion  Ears: TM's intact with mucoid fluid present, no erythema, no bulging; external ears non-tender, no ear canal erythema  Nose: Nares normal. Septum midline. Mucosa normal. No drainage.  Throat: lips, mucosa, and tongue normal; teeth and gums normal  Neck: no adenopathy and supple, symmetrical, trachea midline  Back: CVA tenderness on left, no midline tenderness; no swelling, erythema, or ecchymosis  Lungs: clear to auscultation bilaterally and no rhonchi, rales, or wheezing  Heart: regular rate and rhythm, S1, S2 normal, no murmur, click, rub or gallop  Abdomen: mild hypogastric tenderness to palpation, otherwise soft, no rebound tenderness, no massess or organomegaly  Extremities: extremities normal, atraumatic, no cyanosis or edema   Neurologic: saddle anesthesia, muscle weakness of the left leg    Laboratory:   Recent Results (from the past 24 hour(s))   Urinalysis-UC if Indicated   Result Value Ref Range    Color, UA Yellow Colorless, Yellow, Straw, Light Yellow    Clarity, UA Clear Clear    Glucose, UA Negative Negative    Bilirubin, UA Negative Negative    Ketones, UA Negative Negative    Specific  Gravity, UA 1.025 1.005 - 1.030    Blood, UA Negative Negative    pH, UA 5.5 5.0 - 8.0    Protein, UA Negative Negative mg/dL    Urobilinogen, UA 0.2 E.U./dL 0.2 E.U./dL, 1.0 E.U./dL    Nitrite, UA Negative Negative    Leukocytes, UA Negative Negative   Pregnancy (Beta-hCG, Qual), Urine   Result Value Ref Range    Pregnancy Test, Urine Negative Negative    Specific Gravity, UA 1.025 1.001 - 1.030       I personally reviewed and discussed the findings with the patient

## 2021-06-14 NOTE — PROGRESS NOTES
Chief Complaint   Patient presents with     Emesis     6x days. admit headahces. poss pregnacy       HPI    Patient is here for 6 days of vomiting, 6 episodes yesterday and 4 episodes today, with dry heaving today. She also reported right sided headache x several days, becomes constant moderate to severe the last 2 days with slight photophobia not improved with OTC analgesics, and Excedrin Migraine. No previous similar headaches. She has not been able to tolerate much oral intake today, other than small sips of water. Around 3 AM, and 7 AM today she used her in [employer] diabetic supplies to check her sugar and both occasions she had blood sugar of 32. She had orange juice at 7 AM, and since then only small sips of water. She currently has nausea. She denied diarrhea, abdominal pain, recent dietary changes, travel, fever, chest pain, shortness of breath.    Her LMP was late September. She is not on contraception. Her period has typically been regular.     ROS: Pertinent ROS noted in HPI.     Allergies   Allergen Reactions     Ketorolac Anaphylaxis     Annotation: Throat swollen       Clindamycin Unknown     Stomach pains     Iron Unknown     Other reaction(s): Other, see comments  Pt states having numb lips and itching from IV Iron     Lithium Carbonate      Loperamide      Nsaids (Non-Steroidal Anti-Inflammatory Drug)      Oxycodone Hives     Ropinirole      Tramadol      Codeine Nausea And Vomiting and Rash     States she can take codeine now as of 8/21/17     Lomotil  [Diphenoxylate-Atropine] Rash     Sumatriptan Headache and Palpitations     Severe headache       Patient Active Problem List   Diagnosis     Spinal Adhesive Arachnoiditis     Opioid Withdrawal     Sacroiliitis     Lumbar Radiculopathy     Anemia     Opioid Dependence With Continuous Use     Restless Legs Syndrome     Lower Back Pain Chronic     Chronic Pain     Sciatica     Nonepileptic Seizures     Lumbar Spondylosis     Fatigue     Nicotine Dependence      Esophageal Reflux     Chest Pain     Nephrolithiasis     Hearing Loss     Peripheral Neuropathy     Vitamin B12 Deficiency     Hypoglycemia     Insomnia     Anxiety disorder       Family History   Problem Relation Age of Onset     Breast cancer Mother      Heart attack Father      Hypertension Father      Hyperlipidemia Father        Social History     Social History     Marital status: Single     Spouse name: N/A     Number of children: 1     Years of education: N/A     Occupational History     Not on file.     Social History Main Topics     Smoking status: Current Every Day Smoker     Packs/day: 0.50     Types: Cigarettes     Smokeless tobacco: Never Used     Alcohol use Yes     Drug use: No     Sexual activity: Yes     Partners: Male     Birth control/ protection: Implant, Injection     Other Topics Concern     Not on file     Social History Narrative    Currently staying at a recovery center for drug addiction and alcoholism.         Objective:    Vitals:    11/28/17 1743   BP: 110/60   Pulse: 96   Resp: 12   Temp: 97.5  F (36.4  C)   SpO2: 100%       Gen: well appearing, no distress  Oropharynx:normal throat, oral mucosa  Ears: normal TMs and canals, tubes noted in place  Eyes: normal conjunctiva, PERRLA, EOMI  Neck:NAD, full ROM in all planes without pain  CV:RRR, no M, R, G  Pulm: CTAB, normal effort  Abd: normal bowel sounds, soft, no pain, no HSM/mass  Neuro: Cranial nerves: Normal facial movements, normal speech.  Tongue is midline.  Normal eye movement.  Shoulder shrug is strong and symmetrical.  Skin: no rash      Recent Results (from the past 24 hour(s))   Pregnancy, Urine   Result Value Ref Range    Pregnancy Test, Urine Negative Negative    Specific Gravity, UA 1.025 1.001 - 1.030   Glucose   Result Value Ref Range    Glucose 128 (H) 74 - 125 mg/dL    Patient Fasting > 8hrs? No          Emesis, persistent  -     ondansetron (ZOFRAN ODT) 4 MG disintegrating tablet; Take 1 tablet (4 mg total) by  mouth every 8 (eight) hours as needed for nausea.    Possible pregnancy  -     Pregnancy, Urine    Hypoglycemia  -     Glucose; Future  -     Glucose      Vomiting is of unclear etiology, but patient appeared clinically well during visit. I don't think further workups are indicated. I think Zofran ODT with home hydration is reasonable at this point, f/u as directed.

## 2021-06-16 NOTE — TELEPHONE ENCOUNTER
Telephone Encounter by Roman Arriaga MA at 7/22/2019 11:48 AM     Author: Roman Arriaga MA Service: -- Author Type: Medical Assistant    Filed: 7/22/2019 11:57 AM Encounter Date: 7/22/2019 Status: Signed    : Roman Arriaga MA (Medical Assistant)       Medication: ALPRAZolam (XANAX) 0.5 MG tablet    Last Date Filled 4/30/19     pulled: YES       Only PCP Prescribing?: YES    Taken as prescribed from physician notes?: YES  1. Anxiety  DULoxetine (CYMBALTA) 60 MG capsule     ALPRAZolam (XANAX) 0.5 MG tablet     Drug Abuse 1+, Urine   2. Controlled substance agreement signed  Drug Abuse 1+, Urine   3. Adjustment disorder with mixed anxiety and depressed mood  DULoxetine (CYMBALTA) 60 MG capsule     ALPRAZolam (XANAX) 0.5 MG tablet          CSA in last year: YES 1/2/19    Random Utox in last year: YES 1/2/19    Opioids + benzodiazepines? NO

## 2021-06-17 NOTE — PROGRESS NOTES
Assessment/Plan:        1. Low blood sugar  42-year-old female with a history of gastric bypass and high protein diet as prescribed by bariatric clinic has been presenting with episodes of low blood sugars.  Patient is concerned that this might be an early signs and symptoms of diabetes.  We discussed and reviewed signs and symptoms of hypoglycemia I once again reviewed proper dieting with eating regularly and consistently throughout the day and having snacks to avoid low blood sugars.  She is advised to monitor her blood glucose routinely and keep a log.  Close follow-up is advised  Patient is agreeable to the plan of care.            Subjective:    Patient ID:   Amena Renteria is a 42 y.o. female comes in to discuss noting low blood sugars more frequently in the past few days.  She has a history of gastric bypass and on high-protein diet as prescribed   She was seen at [facility] yesterday and was reviewed proper diet eating at more regular intervals including snacks throughout the day.  She has noted to waking up in the middle the night and being drenched in sweats or similar symptoms occurring a couple of hours after eating.  There is no personal or family history of diabetes, and concerned if this is related to early signs and symptoms of diabetes.       allergies, current medications, past family history, past medical history, past social history, past surgical history and problem list.    Review of Systems  A complete 10 point review of systems was obtained and is negative other than what is stated in the HPI.         Outpatient Encounter Prescriptions as of 4/26/2018   Medication Sig Dispense Refill     albuterol (PROAIR HFA;PROVENTIL HFA;VENTOLIN HFA) 90 mcg/actuation inhaler Inhale 2 puffs every 4 (four) hours as needed for wheezing or shortness of breath. 1 each 0     albuterol (PROVENTIL HFA;VENTOLIN HFA) 90 mcg/actuation inhaler Inhale 2 puffs every 4 (four) hours as needed for wheezing or shortness of breath (or  cough). 18 g 1     albuterol (PROVENTIL) 2.5 mg /3 mL (0.083 %) nebulizer solution Take 3 mL (2.5 mg total) by nebulization every 4 (four) hours as needed for wheezing or shortness of breath (or cough). 30 vial 1     benzonatate (TESSALON PERLES) 100 MG capsule Take 1 capsule (100 mg total) by mouth every 6 (six) hours as needed for cough. 20 capsule 0     bisacodyl (DULCOLAX) 10 mg suppository Insert 10 mg into the rectum.       busPIRone (BUSPAR) 30 MG tablet Take 30 mg by mouth 2 (two) times a day.       gabapentin (NEURONTIN) 600 MG tablet Take 600 mg by mouth 3 (three) times a day.       lurasidone 80 mg Tab Take 60 mg by mouth.        methadone (DOLOPHINE) 10 mg/5 mL solution Take 130 mg by mouth daily.       omeprazole (PRILOSEC) 40 MG capsule Take 40 mg by mouth daily.       ondansetron (ZOFRAN ODT) 4 MG disintegrating tablet Take 1 tablet (4 mg total) by mouth every 8 (eight) hours as needed for nausea. 6 tablet 0     sucralfate (CARAFATE) 100 mg/mL suspension Take 500 mg by mouth 4 (four) times a day.       vilazodone (VIIBRYD) 40 mg Tab tablet Take 40 mg by mouth.       zolpidem (AMBIEN CR) 12.5 MG CR tablet Take 12.5 mg by mouth.       No facility-administered encounter medications on file as of 4/26/2018.          Objective:   /72 (Patient Site: Right Arm, Patient Position: Sitting, Cuff Size: Adult Large)  Pulse 76  Temp 97.5  F (36.4  C) (Oral)   Wt (!) 265 lb (120.2 kg)  SpO2 99%  BMI 41.5 kg/m2      Physical Exam  General Appearance:    Alert, well hydrated, no distress,    Eyes:    PERRL, conjunctiva/corneas clear,    Throat:   Lips, mucosa, and tongue normal; teeth and gums normal   Neck:   Supple, symmetrical, trachea midline, no adenopathy;        thyroid:  No enlargement/tenderness/nodules; no carotid    bruit or JVD   Lungs:     Clear to auscultation bilaterally, respirations unlabored   Heart:    Regular rate and rhythm, S1 and S2 normal, no murmur, rub   or gallop   Abdomen:      Soft, non-tender, normal bowel sounds, no rebound or guarding, no masses, no organomegaly   Extremities:   Extremities normal, atraumatic, no cyanosis or edema   Skin:   Skin color, texture, turgor normal, no rashes or lesions

## 2021-06-18 NOTE — LETTER
Letter by Desirae Major FNP at      Author: Desirae Major FNP Service: -- Author Type: --    Filed:  Encounter Date: 1/2/2019 Status: (Other)         [facility] MEDICINE/OB  01/02/19    Patient: Amena Renteria  YOB: 1975  Medical Record Number: [id number]  CSN: [id number]                                                                              Non-opioid Controlled Substance Agreement    I understand that my care provider has prescribed a controlled substance to help manage my condition(s). I am taking this medicine to help me function or work. I know this is strong medicine, and that it can cause serious side effects. Controlled substances can be sedating, addicting and may cause a dependency on the drug. They can affect my ability to drive or think, and cause depression. They need to be taken exactly as prescribed. Combining controlled substances with certain medicines or chemicals (such as cocaine, sedatives and tranquilizers, sleeping pills, meth) can be dangerous or even fatal. Also, if I stop controlled substances suddenly, I may have severe withdrawal symptoms.  If not helpful, I may be asked to stop them.    The risks, benefits, and side effects of these medicine(s) were explained to me. I agree that:    1. I will take part in other treatments as advised by my care team. This may be psychiatry or counseling, physical therapy, behavioral therapy, group treatment or a referral to a pain clinic. I will reduce or stop my medicine when my care team tells me to do so.  2. I will take my medicines as prescribed. I will not change the dose or schedule unless my care team tells me to. There will be no refills if I run out early.  I may be contactedwithout warning and asked to complete a urine drug test or pill count at any time.   3. I will keep all my appointments, and understand this is part of the monitoring of controlled substances. My care team may require an office visit for EVERY controlled  substance refill. If I miss appointments or dont follow instructions, my care team may stop my medicine.  4. I will not ask other providers to prescribe controlled substances, and I will not accept controlled substances from other people. If I need another prescribed controlled substance for a new reason, I will tell my care team within 1 business day.  5. I will use one pharmacy to fill all of my controlled substance prescriptions, and it is up to me to make sure that I do not run out of my medicines on weekends or holidays. If my care team is willing to refill my controlled substance prescription without a visit, I must request refills only during office hours, refills may take up to 3 days to process, and it may take up to 5 to 7 days for my medicine to be mailed and ready at my pharmacy. Prescriptions will not be mailed anywhere except my pharmacy.    6. I am responsible for my prescriptions. If the medicine/prescription is lost or stolen, it will not be replaced. I also agree not to share controlled substance medicines with anyone.          [facility] MEDICINE/OB  01/02/19  Patient:  Amena Renteria  YOB: 1975  Medical Record Number: [id number]  CSN: [id number]    7. I agree to not use ANY illegal or recreational drugs. This includes marijuana, cocaine, bath salts or other drugs. I agree not to use alcohol unless my care team says I may. I agree to give urine samples whenever asked. If I dont give a urine sample, the care team may stop my medicine.    8. If I enroll in the [place] Medical Marijuana program, I will tell my care team. I will also sign an agreement to share my medical records with my care team.    9. I will bring in my list of medicines (or my medicine bottles) each time I come to the clinic.   10. I will tell my care team right away if I become pregnant or have a new medical problem treated outside of my regular clinic.  11. I understand that this medicine can affect my  thinking and judgment. It may be unsafe for me to drive, use machinery and do dangerous tasks. I will not do any of these things until I know how the medicine affects me. If my dose changes, I will wait to see how it affects me. I will contact my care team if I have concerns about medicine side effects.    I understand that if I do not follow any of the conditions above, my prescriptions or treatment may be stopped.      I agree that my provider, clinic care team, and pharmacy may work with any city, state or federal law enforcement agency that investigates the misuse, sale, or other diversion of my controlled medicine. I will allow my provider to discuss my care with or share a copy of this agreement with any other treating provider, pharmacy or emergency room where I receive care. I agree to give up (waive) any right of privacy or confidentiality with respect to these consents.   I have read this agreement and have asked questions about anything I did not understand.    ___________________________________________________________________________  Patient signature - Date/Time  -Amena Renteria                                      ___________________________________________________________________________  Witness signature                                                                    ___________________________________________________________________________  Provider signature- LEONEL Jackson

## 2021-06-18 NOTE — LETTER
Letter by Desirae Major FNP at      Author: Desirae Major FNP Service: -- Author Type: --    Filed:  Encounter Date: 2019 Status: (Other)       Amena Renteria  [address] [facility] [address]      2019      Dear Amena Renteria,   : 1975      This letter is in regards to the appointment that you had scheduled on 19 at the [facility] with Desirae Major.     The [facility] strives to see all patients in a timely manner and we need your help to achieve this.  The above-mentioned appointment was missed and we do not have record of a cancellation by you.  Whenever possible, we request appointment cancellations at least 72 hours in advance.  This time allows us to offer the appointment to another patient in need.      If you feel you have received this letter in error, or if you need to reschedule this appointment, please call our office so that we may update our records.      Sincerely,    [facility]

## 2021-06-20 NOTE — PROGRESS NOTES
"TCM DISCHARGE FOLLOW UP CALL    Discharge Date:  9/1/2018  Reason for hospital stay (discharge diagnosis)::  Intractable headache; chronic back pain  Are you feeling better, the same or worse since your discharge?:  Patient is feeling the same (Still having HAs, N/V, dry heaves, fever comes & goes; yesterday temp was 104.4 (O), now 99.  Can't sleep \"d/t prednisone\" she had in the hospital; sleeping in 15-20 minute intervals, then \"wide awake another 12-14 hours before another 15-20 min of sleep.\")  Do you feel like you have a plan in the event of a health emergency?: Yes (WIC, ER, nurse triage.)    As part of your discharge plan, were  home care services ordered for you?: No    Did you receive any new medications, or was there a change to your medications?: Yes    Are you taking those medications, or do you have any established regiment?:  States she was prescribed a Prednisone taper on d/c, picked it up from the pharmacy but hasn't taken it d/t insomnia; hasn't slept more than 15-20 minute intervals since Friday, 8/31/18.  Do you have any follow up visits scheduled with your PCP or Specialist?:  Yes, with PCP (Dr. CAMPUZANO 9/6/18)  (RN) Is PCP appt scheduled soon enough (within 14 days of discharge date)?: Yes    RN NOTES::  Advised calling triage nurse to see if she can get an appt today w/Dr. CAMPUZANO, or if anything else she can do before her appt tomorrow.      Gracia Everett RN Care Manager, Population Health    "

## 2021-06-20 NOTE — PROGRESS NOTES
Hospital discharge follow up call to pt, no answer.  LVM that RN will try back another time, & reminded pt of their upcoming hospital f/u appt w/Dr. Montes De Oca, 9/6/18, 9:10am.    Gracia Everett RN Care Manager, Population Health

## 2021-06-20 NOTE — PROGRESS NOTES
Attempt 1: Care Guide called patient.  If this patient is returning my call, please transfer to Zuleyma at ext [phone].

## 2021-06-20 NOTE — PROGRESS NOTES
"TCM DISCHARGE FOLLOW UP CALL    Discharge Date:  8/30/2018  Reason for hospital stay (discharge diagnosis)::  H/A, intractable N/V (Was in ED yesterdat after hospital discharge  Are you feeling better, the same or worse since your discharge?:  Patient is feeling worse (anxiety, H/A, diarrhea)  Why are you feeling worse?:  \"I don't feel right\". Anxiety, severe H/A, diarrhea, temp 101 , having trouble thinking, problems processing info, pacing.  Do you feel like you have a plan in the event of a health emergency?: No    (RN) Patient provided with information to call us in the event of a health emergency: Yes    As part of your discharge plan, were  home care services ordered for you?: No    Did you receive any new medications, or was there a change to your medications?: Yes    Are you taking those medications, or do you have any established regiment?:  Took melatonin x2 last night. Needs refills for Zanaflex and Vistaril. She's calling pharmacy for Zofran refill.  Do you have any follow up visits scheduled with your PCP or Specialist?:  Yes, with PCP  RN NOTES::   Reviewed relaxation techniques. Suggested pt read info on [employer] website. Suggested pt talk to Dr CAMPUZANO about seeing a H/A specialist.      "

## 2021-06-20 NOTE — PROGRESS NOTES
Assessment/Plan:        1. Intractable headache  Amena Renteria is a 42 y.o. female female with a h/o  depression, anxiety, chronic pain, opiate dependence, with recent multiple emergency visits, presenting with intractable headaches as detailed in the HPI. She's had an unremarkable LP and head CT eval so far.   Recent ER visits, evaluations, and today's exam was reviewed and discussed with the patient.   With the weekend coming up, she is reluctant to being managed in outpatient setting and would rather be checked into the hospital.   I discussed her case with the admitting hospitalist, and arranged for a hospital admission today.             55  minutes spent on this visit with more than 50% time spent on coordination of care with staff, and reviewing medical record.        Subjective:    Patient ID:   Amena Renteria is a 42 y.o. female comes in follow up of headaches.   She's been having an intractable headache for the past several days, and underwent LP procedure for CSF evaluation of lyme disease which turned out weakly positive on screening, but confirmatory testing was negative. She sustained pneumocephalus resulting from LP around 8/16/18.  This was resolved on repeat CT head imaging with last CT performed 2 days ago.  She also underwent two blood patches without improvement.     She reports of having headaches located in the right frontal and top of her head, with a pushing or pressure like sensation, and having fevers of up to 103, taken down by taking the tylenol.   She has been intermittently nauseated,       Review of Systems  General : see HPI   Ophthalmic : negative  ENT : negative  Respiratory : no cough, shortness of breath, or wheezing  Cardiovascular : no chest pain or dyspnea on exertion  Gastrointestinal : no abdominal pain, change in bowel habits, or black or bloody stools  Genito-Urinary :  no dysuria, trouble voiding, or hematuria  Musculoskeletal : negative  Neurological : see HPI   Dermatological  : negative    Allergy: reviewed  Hematological and Lymphatic : negative  Endocrine : negative     The following patient's history were reviewed and updated as appropriate:   She  has a past medical history of Anxiety; Chemical dependency (H); Chronic back pain; Depression; gastric bypass; Kidney stone; and PTSD (post-traumatic stress disorder).  She  has a past surgical history that includes pr devlin w/o facetec foramot/dskc / vrt seg, cervical; pr cysto/uretero/pyeloscopy, dx; pr arthrodesis ant interbody min discectomy,lumbar; pr tmj arthroscopy/diagnostic; pr gastric bypass,obese<150cm [id number]; Cholecystectomy; Appendectomy; and  section.  She  reports that she has been smoking Cigarettes.  She has been smoking about 0.50 packs per day. She has never used smokeless tobacco. She reports that she drinks alcohol. She reports that she does not use illicit drugs..      Outpatient Encounter Prescriptions as of 2018   Medication Sig Dispense Refill     acetaminophen (TYLENOL) 325 MG tablet Take 2 tablets (650 mg total) by mouth every 6 (six) hours as needed for pain.  0     ondansetron (ZOFRAN-ODT) 4 MG disintegrating tablet Take 4 mg by mouth every 8 (eight) hours as needed for nausea.       hydrOXYzine pamoate (VISTARIL) 25 MG capsule Take 1-2 capsules (25-50 mg total) by mouth every 6 (six) hours as needed for itching or anxiety. 20 capsule 0     melatonin 3 mg Tab tablet Take 1 tablet (3 mg total) by mouth at bedtime as needed.  0     multivitamin therapeutic tablet Take 1 tablet by mouth daily.       tiZANidine (ZANAFLEX) 4 MG tablet Take 2 tablets (8 mg total) by mouth every 6 (six) hours as needed (headaches). 15 tablet 0     zolpidem (AMBIEN) 10 mg tablet Take 1 tablet (10 mg total) by mouth at bedtime as needed for sleep. 3 tablet 0     Facility-Administered Encounter Medications as of 2018   Medication Dose Route Frequency Provider Last Rate Last Dose     [COMPLETED] diphenhydrAMINE  injection 50 mg (BENADRYL)  50 mg Intravenous Once Eduardo Shultz MD   50 mg at 08/30/18 2125     [COMPLETED] HYDROmorphone injection 1 mg (DILAUDID)  1 mg Intravenous Once Eduardo Shultz MD   1 mg at 08/30/18 2225     [COMPLETED] prochlorperazine injection 10 mg (COMPAZINE)  10 mg Intravenous Once Eduardo Shultz MD   10 mg at 08/30/18 2129     [COMPLETED] sodium chloride 0.9% 1,000 mL  1,000 mL Intravenous Once Eduardo Shultz MD   Stopped at 08/30/18 2237     [DISCONTINUED] acetaminophen CR tablet 1,300 mg (TYLENOL)  1,300 mg Oral Q8H FIXED TIMES Ana Vogel MD   1,300 mg at 08/30/18 1318     [DISCONTINUED] bisacodyl suppository 10 mg (DULCOLAX)  10 mg Rectal Daily PRN Ana Vogel MD         [DISCONTINUED] gabapentin capsule 200 mg (NEURONTIN)  200 mg Oral TID Ana Vogel MD   200 mg at 08/29/18 2021     [DISCONTINUED] hydrOXYzine pamoate capsule 25-50 mg (VISTARIL)  25-50 mg Oral Q6H PRN MOUSTAPHA [place]   50 mg at 08/29/18 2022     [DISCONTINUED] lidocaine 10 mg/mL (1 %) injection 0.1-0.3 mL  0.1-0.3 mL Subcutaneous PRN Van Aguiar MD   0.3 mL at 08/30/18 0945     [DISCONTINUED] magnesium hydroxide suspension 30 mL (MILK OF MAG)  30 mL Oral Daily PRN Ana Vogel MD         [DISCONTINUED] melatonin tablet 3 mg  3 mg Oral Bedtime PRN Ana Vogel MD         [DISCONTINUED] mirtazapine tablet 15 mg (REMERON)  15 mg Oral QHS Ana Vogel MD   15 mg at 08/29/18 2022     [DISCONTINUED] naloxone injection 0.2-0.4 mg (NARCAN)  0.2-0.4 mg Intravenous PRN Gautam Hoffmann DO         [DISCONTINUED] naloxone injection 0.2-0.4 mg (NARCAN)  0.2-0.4 mg Intramuscular PRN Gautam Hoffmann DO         [DISCONTINUED] naloxone injection 0.2-0.4 mg (NARCAN)  0.2-0.4 mg Intravenous PRN Eduardo Shultz MD         [DISCONTINUED] naloxone injection 0.2-0.4 mg (NARCAN)  0.2-0.4 mg Intramuscular PRN Eduardo Shultz MD         [DISCONTINUED] ondansetron disintegrating tablet 4 mg (ZOFRAN-ODT)  4 mg Oral Q8H PRN Ana Vogel MD          [DISCONTINUED] ondansetron injection 4 mg (ZOFRAN)  4 mg Intravenous Q4H PRN Ana Vogel MD   4 mg at 08/30/18 0912     [DISCONTINUED] ondansetron tablet 8 mg (ZOFRAN)  8 mg Oral Q8H PRN Ana Vogel MD         [DISCONTINUED] prochlorperazine tablet 10 mg (COMPAZINE)  10 mg Oral Q6H PRN Ana Vogel MD   10 mg at 08/29/18 2022     [DISCONTINUED] ramelteon tablet 8 mg (ROZEREM)  8 mg Oral Bedtime PRN Suyapa Peña CNP         [DISCONTINUED] senna-docusate 8.6-50 mg tablet 1 tablet (PERICOLACE)  1 tablet Oral BID Ana Vogel MD         [DISCONTINUED] sodium chloride flush 3 mL (NS)  3 mL Intravenous Line Care Van Aguiar MD   3 mL at 08/30/18 0913     [DISCONTINUED] sodium chloride flush 3 mL (NS)  3 mL Intravenous PRN Eduardo Shultz MD         [DISCONTINUED] tiZANidine tablet 4-8 mg (ZANAFLEX)  4-8 mg Oral TID PRN MOUSTAPHA [place]   8 mg at 08/30/18 0352     [DISCONTINUED] zolpidem tablet 10 mg (AMBIEN)  10 mg Oral Bedtime PRN Ana Vogel MD   10 mg at 08/29/18 2021         Objective:   /70 (Patient Site: Right Arm, Patient Position: Sitting, Cuff Size: Adult Large)  Pulse 77  Temp 98.6  F (37  C) (Oral)   Wt (!) 249 lb 4.8 oz (113.1 kg)  LMP 08/17/2018 (Exact Date)  SpO2 99%  BMI 39.05 kg/m2      Physical Exam  General Appearance:    Alert, cooperative, no distress,    Head:   Nl to inspection, Atraumatic   Eyes:    PERRL, conjunctiva/corneas clear, Nl fundoscopic exam    ENT:   Nl Ear exam, Lips, mucosa, and tongue normal; teeth and gums normal   Neck:   Supple, symmetrical, trachea midline, no adenopathy;        thyroid:  No enlargement/tenderness/nodules; no carotid    bruit or JVD   Lungs:     Clear to auscultation bilaterally, respirations unlabored   Heart:    Regular rate and rhythm, S1 and S2 normal, no murmur, rub   or gallop   Abdomen:     Soft, non-tender, normal bowel sounds, no rebound or guarding, no masses, no organomegaly   Neuro:    Cranial nerves II-XII grossly intact. No focal  neurological deficit. No involuntary movements,    Skin:   Skin color, texture, turgor normal, no rashes or lesions

## 2021-06-20 NOTE — PROGRESS NOTES
Attempt 2: Care Guide called patient.  If this patient is returning my call, please transfer to [facility] at ext [phone].

## 2021-06-20 NOTE — PROGRESS NOTES
Assessment/Plan:        1. Headache  Exam and hospital course/ notes were reviewed.   extensive workup for headache, with a repeat CT head and lumbar puncture, underwent repeat blood patch by IR.    Plan:   Discussed a follow up referral to Neurology for on going headaches.     Consider pain management clinic.         Subjective:    Patient ID:   Amena Renteria is a 42 y.o. female comes in for post hospital follow-up and admission on 8/31 2018 for intractable headaches.  She was admitted and Neurology was consulted, recommended a course of IV steroids.  Patient nausea /vomiting improved and was able to tolerate oral diet.   She was discharged on a week of tapering dose of prednisone.  She states to still having the headaches, but not as bad, and inquiring about opitates for treatment.     Review of Systems  General : negative  Ophthalmic : negative  ENT : negative  Respiratory : no cough, shortness of breath, or wheezing  Cardiovascular : no chest pain or dyspnea on exertion  Gastrointestinal : no abdominal pain, change in bowel habits, or black or bloody stools  Musculoskeletal : negative  Neurological : see HPI   Dermatological : negative    Allergy: reviewed    The following patient's history were reviewed and updated as appropriate:   She  has a past medical history of Anxiety; Chemical dependency (H); Chronic back pain; Depression; gastric bypass; Kidney stone; and PTSD (post-traumatic stress disorder)..      Outpatient Encounter Prescriptions as of 9/6/2018   Medication Sig Dispense Refill     acetaminophen (TYLENOL) 325 MG tablet Take 2 tablets (650 mg total) by mouth every 6 (six) hours as needed for pain.  0     hydrOXYzine pamoate (VISTARIL) 25 MG capsule Take 1-2 capsules (25-50 mg total) by mouth every 6 (six) hours as needed for itching or anxiety. 20 capsule 0     melatonin 3 mg Tab tablet Take 1 tablet (3 mg total) by mouth at bedtime as needed.  0     multivitamin therapeutic tablet Take 1 tablet by  mouth daily.       ondansetron (ZOFRAN-ODT) 4 MG disintegrating tablet Take 4 mg by mouth every 8 (eight) hours as needed for nausea.       predniSONE (DELTASONE) 10 mg tablet Take 4  tabs daily for 2  days, then 3  tabs daily for 2 days, then 2  tabs daily for 2 days, then 1  tab daily for 2  days, then stop. 20 tablet 0     tiZANidine (ZANAFLEX) 4 MG tablet Take 2 tablets (8 mg total) by mouth every 6 (six) hours as needed (headaches). 15 tablet 0     zolpidem (AMBIEN) 10 mg tablet Take 1 tablet (10 mg total) by mouth at bedtime as needed for sleep. 3 tablet 0     No facility-administered encounter medications on file as of 9/6/2018.          Objective:   /78 (Patient Site: Right Arm, Patient Position: Sitting, Cuff Size: Adult Large)  Pulse 90  Temp 98.3  F (36.8  C) (Oral)   Wt (!) 250 lb 9.6 oz (113.7 kg)  LMP 08/17/2018 (Exact Date)  SpO2 99%  BMI 39.25 kg/m2      Physical Exam  General Appearance:    Alert, cooperative, no distress,    Head:   Nl to inspection, Atraumatic   Eyes:    PERRL, conjunctiva/corneas clear, Nl fundoscopic exam    ENT:   Nl Ear exam, Lips, mucosa, and tongue normal; teeth and gums normal   Neck:   Supple, symmetrical, trachea midline, no adenopathy;        thyroid:  No enlargement/tenderness/nodules; no carotid    bruit or JVD   Lungs:     Clear to auscultation bilaterally, respirations unlabored   Heart:    Regular rate and rhythm, S1 and S2 normal, no murmur, rub   or gallop   Neuro:    Cranial nerves II-XII grossly intact. No focal neurological deficit. No involuntary movements,    Skin:   Skin color, texture, turgor normal, no rashes or lesions

## 2021-06-22 NOTE — PROGRESS NOTES
[facility]  Internal Medicine - Office Visit    Patient: Amena Renteria   MRN: [id number]   Date of Service: 12/03/18   Patient Care Team:  Agustín Montes De Oca MD as PCP - General (Family Medicine)    ASSESSMENT/PLAN      Aemna Renteria is a 42-year-old woman with history of depression and anxiety who presents today due to a reaction to Klonopin.    1. Allergic reaction to klonopin  She met criteria for anaphylaxis (diffuse hives and significant GI symptoms of vomiting/diarrhea within 20 minutes of taking clonazepam) at home. Patient had been on diazepam and lorazepam in the past (per review of chart and reviewing this with patient), even being on diazepam for years previously. She would like a prescription for a benzodiazepine to bridge her until her psychiatry appointment next week because of the debilitating, severe panic attacks she is having. Discussed the risks and benefits of prescribing another benzodiazepine for patient. Given severity of her anxiety/panic attacks, she wished to proceed with a benzodiazepine she had tolerated in the past with close monitoring for any signs/symptoms of allergic reaction or anaphylaxis. Plan:    Diazepam PRN at bedtime (only gave her enough to bridge her to psychiatry appointment, at which time the psychiatrist can continue her on it if she finds it suitable)    Educated patient on signs/symptoms of anaphylaxis/allergic reactions    Epipen prescribed and patient will receive education at the pharmacy    Follow up with Psychiatry next week as scheduled for initial consultation    Leslie Bell MD  Internal Medicine and Pediatrics  [facility]  Pager [phone]    SUBJECTIVE       Amena Renteria is a 42-year-old woman with history of depression and anxiety who presents today due to a reaction to Klonopin.  The patient recently was seen by Grace Hernandez last week on November 29.  At that time she had had worsening of her anxiety and  depression and so she was started on Lexapro 10 mg as well as Klonopin 0.5 mg at bedtime as needed.  She did take the Klonopin about 3 times in the last week.  Each time she took it she developed hives on her extremities and had severe vomiting.  She also would feel lightheaded and had diarrhea. The symptoms started within 20 minutes of taking the Klonopin.  Her last dose was 2 days ago on Saturday night. It was not related to when she would take the Lexapro.     In the past she has been on other benzodiazepines.  Years ago she reports she was on a benzodiazepine.  Looking through the chart it appears that she had been on diazepam.  She also reports that in the hospital she was given Ativan, and this was confirmed on looking through the chart from her recent hospitalization in August 2018.  She reports that she needs a benzodiazepine to help with her panic attacks.  It is particularly worse at night because she cannot sleep.  The hydroxyzine that she has is not effective.  She used to be on diazepam chronically when she was seen at [facility] psychiatry, however it has been years that she has been on it chronically.  She reports that her anxiety has been fairly significant.  She has had uncontrollable crying, feels jittery, and has chest tightness and shortness of breath with her panic attacks.  She has not had energy to do chores around the home and she cannot seem to focus.  She feels paranoid and constantly thinks other people are thinking that she is crazy.  She has not had any visual or auditory hallucinations.  She has not had any suicidal ideation or any plans.  Denies any history of prior suicide attempts.  She has been talking to her father-in-law daily regarding her symptoms.     She has started the Lexapro and does not have any worsening of the symptoms or any GI symptoms.  Grace had made a referral to psychiatry last week and it appears that the first appointment is scheduled for next Friday on 14 December.   A welcome back and has been sent up with the patient has not received this information yet.     Review of Systems  Pertinent items are noted in HPI    Past Medical/Surgical History  Reviewed and updated as appropriate    Medications    Current Outpatient Medications:      acetaminophen (TYLENOL) 325 MG tablet, Take 2 tablets (650 mg total) by mouth every 6 (six) hours as needed for pain., Disp: , Rfl: 0     clonazePAM (KLONOPIN) 0.5 MG tablet, Take 1 tablet (0.5 mg total) by mouth at bedtime as needed for anxiety., Disp: 30 tablet, Rfl: 0     escitalopram oxalate (LEXAPRO) 10 MG tablet, Take 1 tablet (10 mg total) by mouth daily., Disp: 30 tablet, Rfl: 2     hydrOXYzine pamoate (VISTARIL) 25 MG capsule, Take 1-2 capsules (25-50 mg total) by mouth every 6 (six) hours as needed for itching or anxiety., Disp: 20 capsule, Rfl: 0     melatonin 3 mg Tab tablet, Take 1 tablet (3 mg total) by mouth at bedtime as needed., Disp: , Rfl: 0     multivitamin therapeutic tablet, Take 1 tablet by mouth daily., Disp: , Rfl:      ondansetron (ZOFRAN-ODT) 4 MG disintegrating tablet, Take 4 mg by mouth every 8 (eight) hours as needed for nausea., Disp: , Rfl:      tiZANidine (ZANAFLEX) 4 MG tablet, Take 2 tablets (8 mg total) by mouth every 6 (six) hours as needed (headaches)., Disp: 15 tablet, Rfl: 0     zolpidem (AMBIEN) 10 mg tablet, Take 1 tablet (10 mg total) by mouth at bedtime as needed for sleep., Disp: 3 tablet, Rfl: 0    Allergies  Allergies   Allergen Reactions     Ketorolac Anaphylaxis     Annotation: Throat swollen       Lithium Carbonate      seizures     Nsaids (Non-Steroidal Anti-Inflammatory Drug)      To avoid     Lomotil  [Diphenoxylate-Atropine] Rash     Loperamide Rash     Sumatriptan Headache and Palpitations     Severe headache     Topiramate Rash       Social History  Reviewed and updated as appropriate.  She is currently living with her in-laws.          OBJECTIVE       Wt (!) 252 lb (114.3 kg)   LMP  11/20/2018 (Exact Date)   BMI 39.47 kg/m      General Appearance:    Alert anxious, tearful at times   Lungs:     Clear to auscultation bilaterally, respirations unlabored    Heart:    Regular rate and rhythm, S1 and S2 normal, no murmur, rub    or gallop   Abdomen:     Soft, non-tender   Psych:   Anxious affect, normal coherent speech, well groomed   Neurologic:   CNII-XII intact, normal strength, sensation and reflexes     throughout
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
## 2021-06-22 NOTE — PROGRESS NOTES
Attempt 1: Care Guide called patient regarding new referral from Dr. Sorensen.  If this patient is returning my call, please transfer to Thalia Mares at ext [phone].

## 2021-06-22 NOTE — PROGRESS NOTES
ASSESSMENT:  1. Adjustment disorder with mixed anxiety and depressed mood     2. Anxiety  diazePAM (VALIUM) 5 MG tablet    hydrOXYzine pamoate (VISTARIL) 25 MG capsule   3. Primary insomnia  zolpidem (AMBIEN) 10 mg tablet       PLAN:  Patient to follow-up with primary care provider in 2 weeks to discuss continuation of medications.  Refill provided of diazepam to treat acute anxiety symptoms.  Discussed proper use of hydroxyzine as well and how this can be helpful for sleep or anxiety.  Discussed not combining medications due to side effects.  Patient demonstrated understanding.  A few tablets of Ambien given for insomnia.  Patient does have follow-up with psychiatry scheduled on 4 February.  If this is too far out and patient is having trouble rescheduling sooner let us know and we can try to assist.  No problem-specific Assessment & Plan notes found for this encounter.    The following are part of a depression follow up plan for the patient: under care of mental health counselor, under care of mental health team, completion of mental health crisis plan and psychiatric follow-up  There are no Patient Instructions on file for this visit.    No orders of the defined types were placed in this encounter.    Medications Discontinued During This Encounter   Medication Reason     cholecalciferol, vitamin D3, 5,000 unit capsule Therapy completed     DEBROX 6.5 % otic solution Therapy completed     melatonin 3 mg Tab tablet Therapy completed     omeprazole (PRILOSEC) 20 MG capsule Therapy completed     ondansetron (ZOFRAN-ODT) 4 MG disintegrating tablet Therapy completed     tiZANidine (ZANAFLEX) 4 MG tablet Therapy completed     zolpidem (AMBIEN) 10 mg tablet Therapy completed     diazePAM (VALIUM) 5 MG tablet Reorder     hydrOXYzine pamoate (VISTARIL) 25 MG capsule Reorder       No Follow-up on file.    CHIEF COMPLAINT:  Chief Complaint   Patient presents with     Anxiety     2wk panic attacks with depression        HISTORY OF PRESENT ILLNESS:  Amena is a 42 y.o. female 0 here today for follow-up on anxiety and depression.  Patient was seen by her primary care provider on 12/4/2018 and one other time prior.  Patient had expressed worsening anxiety and depressive symptoms.  She was started back on Lexapro at 10 mg which was shortly increased at her appointment on the fourth to 20 mg.  She was also given diazepam for as needed use and hydroxyzine for as needed use nightly to help with sleep.  Patient did have some improvement when she saw psychiatry a few days ago, however was under the assumption that it was supposed to be a medication check but the person she saw  and unable to follow-up with psychiatry on 2/4/2019, but is not sure she can wait long enough for this appointment.  She has had a lot of situational factors that have worsened her symptoms.  Her long-term fiancé and her daughter has a known affair with someone else.  Still mostly living in the same quarters but are not going to stay together.  She does have a place to stay and can also stay with her friend who lives across the street.  She is interested in care management we discussed that today so we will provide her referral.  For now she is out of diazepam, needs more of this if we can prescribe for panic attack and anxiety.  She does feel like the Lexapro is starting to work but still feels like she is somewhat in crisis mode.  No thoughts of self-harm or homicidal ideation.  Her daughter has autism and is being evaluated for ADHD so that adds another layer of stress to her situation.  She is happy to follow-up with psychiatry but in the interim is hoping that myself or Grace Hernandez will continue to prescribe medication for her in the interim.  She has had trouble sleeping lately and feels this is also contributing to her symptoms.    REVIEW OF SYSTEMS:        All other systems are negative  PFSH:  Reviewed, no changes      TOBACCO USE:  Social  History     Tobacco Use   Smoking Status Current Every Day Smoker     Packs/day: 0.50     Types: Cigarettes   Smokeless Tobacco Never Used       VITALS:  Vitals:    12/18/18 1027   BP: (!) 124/96   Patient Site: Left Arm   Patient Position: Sitting   Cuff Size: Adult Large   Pulse: 78   Resp: 16   Temp: 96.8  F (36  C)   TempSrc: Oral   SpO2: 98%   Weight: (!) 253 lb (114.8 kg)     Wt Readings from Last 3 Encounters:   12/18/18 (!) 253 lb (114.8 kg)   12/04/18 (!) 254 lb (115.2 kg)   12/03/18 (!) 252 lb (114.3 kg)       PHYSICAL EXAM:   BP (!) 124/96 (Patient Site: Left Arm, Patient Position: Sitting, Cuff Size: Adult Large)   Pulse 78   Temp 96.8  F (36  C) (Oral)   Resp 16   Wt (!) 253 lb (114.8 kg)   LMP 11/20/2018 (Exact Date)   SpO2 98%   BMI 39.63 kg/m    General appearance: alert, appears stated age and cooperative  Neurologic: Grossly normal  Psychologic: anxious appearing, Mood and affect normal.      DATA REVIEWED:  Additional History from Old Records Summarized (2): notes from psych and [facility] Hale  Decision to Obtain Records (1): 0  Radiology Tests Summarized or Ordered (1): 0  Labs Reviewed or Ordered (1): 0  Medicine Test Summarized or Ordered (1): 0  Independent Review of EKG or X-RAY(2 each): 0    The visit lasted a total of 25 minutes face to face with the patient. Over 50% of the time was spent counseling and educating the patient about plan of care.    MEDICATIONS:  Current Outpatient Medications   Medication Sig Dispense Refill     acetaminophen (TYLENOL) 325 MG tablet Take 2 tablets (650 mg total) by mouth every 6 (six) hours as needed for pain.  0     diazePAM (VALIUM) 5 MG tablet Take 1 tablet (5 mg total) by mouth every 6 (six) hours as needed for anxiety or sleep. 30 tablet 0     EPINEPHrine (EPIPEN) 0.3 mg/0.3 mL injection Inject 0.3 mL (0.3 mg total) as directed as needed for anaphylaxis Inject into thigh.. 2 Pre-filled Pen Syringe 0     escitalopram oxalate (LEXAPRO) 20 MG tablet  Take 1 tablet (20 mg total) by mouth daily. 30 tablet 2     multivitamin therapeutic tablet Take 1 tablet by mouth daily.       hydrOXYzine pamoate (VISTARIL) 25 MG capsule Take 1-2 capsules (25-50 mg total) by mouth every 6 (six) hours as needed for itching or anxiety. 30 capsule 0     zolpidem (AMBIEN) 10 mg tablet Take 1 tablet (10 mg total) by mouth at bedtime as needed for sleep. 5 tablet 0     No current facility-administered medications for this visit.        This note has been dictated using voice recognition software. Any grammatical or context distortions are unintentional and inherent to the software

## 2021-06-22 NOTE — TELEPHONE ENCOUNTER
Spoke to Amena and she stated she was told to go and see Desirae Major at [facility].  She is on her way there now regarding message below.      No further questions or concerns.
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
## 2021-06-22 NOTE — PATIENT INSTRUCTIONS - HE
1. Continue working with your PCP for medications until you get to see Linnea for psychiatric care  2. Use crisis lines provided as needed.  3. Return in 2 weeks as agreed upon for the next therapy session.
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
## 2021-06-22 NOTE — PROGRESS NOTES
Attempt 2: Care Guide called patient.  If this patient is returning my call, please transfer to [place] at ext [phone]. Tried to connect with patient after clinic visit today but was unsuccessful.

## 2021-06-22 NOTE — PROGRESS NOTES
HPI:  Amena Renteria is a 42 y.o. female who is seen for   Chief Complaint   Patient presents with     Depression     Anxiety   .Amena Renteria is new to my practice today, did see Dr.Mark Miguel Montes De Oca MD  In the past and has also seen Dr. Marcos once in September.  At that time she was having regular headaches and she was given a neurology consult, this appointment is in January.  Peak, bitemporal, and occipital.  She has been taking Excedrin Migraine 2 tablets twice daily for the last week.  She is also having symptoms of panic.  She states they started about her father's birthdate in September and have progressively gotten worse since this month on the 11th, which is the 14th anniversary of her father's death.  She has had a long history of anxiety and depression and was on Latuda approximately 3 years ago.  She took herself off of psychiatric medications about 1 year after treatment for substance abuse, methadone for OxyContin.  She had had a long history of back pain and has had several back surgeries.  She remains off all of these medications, denies any illicit drug use, denies any short acting anxiolytic use.  She does not drink alcohol.  Has increased stress due to relationship problems with her fiancé.  She lives with her fiancé's parents, with their child.  He has a good relationship with her father-in-law.  She describes her panic attack as feeling like she is crawling out of her skin, inability to sleep for the past week, feeling like her father's death just happened again.    ROS: Patient denies fever, chills, sweats, fainting, fatigue, weight change, dizziness, sleep problems, chest pain, palpitations, shortness of breath, wheezing, cough,  sore throat, changes in hearing, ear pain,tinnitus,  disphagia, sore throat, globus, changes in vision, eye pain eye redness, acid reflux, nausea, vomiting, diarrhea, constipation, black or bloody stools,  Dysuria, frequency, urinary incontinence, nocturia,  hematuria, back pain,joint pain, bone pain, muscle cramps,edema, weakness, numbness, tingling of extremities, rash, itching, skin changes, swollen lymph nodes, thirst, increased urination, breast lumps, breast pain, nipple discharge, memory difficulties,mood swings, (female)vaginal discharge, dyspareunia, menorrhagia, pelvic pain, sexual dysfunction,   (male) testicular lumps, erectile dysfunction.    Lab Results   Component Value Date    HGBA1C 5.4 04/25/2018    HGBA1C 4.6 02/19/2014     Lab Results   Component Value Date    CREATININE 0.66 08/31/2018     Patient Active Problem List   Diagnosis     Spinal Adhesive Arachnoiditis     Opioid Withdrawal     Sacroiliitis     Lumbar Radiculopathy     Anemia     Opioid Dependence With Continuous Use     Restless Legs Syndrome     Lower Back Pain Chronic     Chronic pain     Sciatica     Anxiety     Nonepileptic Seizures     Lumbar Spondylosis     Fatigue     Tobacco abuse     Esophageal Reflux     Chest Pain     Nausea and vomiting     Nephrolithiasis     Hearing Loss     Peripheral Neuropathy     Vitamin B12 Deficiency     Hypoglycemia     Insomnia     Anxiety disorder     Headache     Lyme disease     H/O gastric bypass     Obesity due to excess calories     Fever, unspecified fever cause     Traumatic pneumocephalus     Intracranial hypotension     Intractable headache, unspecified chronicity pattern, unspecified headache type     Adjustment disorder with anxious mood     History of bipolar disorder     Mood disorder due to medical condition     Sleep difficulties     Intractable headache     Chronic back pain     Family History   Problem Relation Age of Onset     Breast cancer Mother      Heart attack Father      Hypertension Father      Hyperlipidemia Father      Social History     Socioeconomic History     Marital status: Single     Spouse name: None     Number of children: 1     Years of education: None     Highest education level: None   Social Needs     Financial  resource strain: None     Food insecurity - worry: None     Food insecurity - inability: None     Transportation needs - medical: None     Transportation needs - non-medical: None   Occupational History     None   Tobacco Use     Smoking status: Current Every Day Smoker     Packs/day: 0.50     Types: Cigarettes     Smokeless tobacco: Never Used   Substance and Sexual Activity     Alcohol use: Yes     Drug use: No     Sexual activity: Yes     Partners: Male     Birth control/protection: Implant, Injection   Other Topics Concern     None   Social History Narrative    Currently staying at a recovery center for drug addiction and alcoholism.     Past Surgical History:   Procedure Laterality Date     APPENDECTOMY        SECTION       CHOLECYSTECTOMY       NE ARTHRODESIS ANT INTERBODY MIN DISCECTOMY,LUMBAR      Description: Lumbar Vertebral Fusion;  Recorded: 2012;  Annotations: Lumbar Fusion L3-L5 in .     NE CYSTO/URETERO/PYELOSCOPY, DX      Description: Cystoscopy With Ureteroscopy;  Recorded: 2012;  Annotations: stenting in  for nephrolithiasis.     NE GASTRIC BYPASS,OBESE<150CM CASSIA-EN-Y      Description: Gastric Surgery For Morbid Obesity Bypass With Cassia-en-Y;  Recorded: 2013;     NE VARGAS W/O FACETEC FORAMOT/DSKC / VRT SEG, CERVICAL      Description: Laminectomy Lumbar;  Recorded: 2012;  Annotations: L5-S1 lami in , and again in  when she re-injured her back.     NE TMJ ARTHROSCOPY/DIAGNOSTIC      Description: Arthroscopy Temporomandibular Joint;  Proc Date: 2003;     Current Outpatient Medications on File Prior to Visit   Medication Sig Dispense Refill     acetaminophen (TYLENOL) 325 MG tablet Take 2 tablets (650 mg total) by mouth every 6 (six) hours as needed for pain.  0     multivitamin therapeutic tablet Take 1 tablet by mouth daily.       hydrOXYzine pamoate (VISTARIL) 25 MG capsule Take 1-2 capsules (25-50 mg total) by mouth every 6 (six) hours as  needed for itching or anxiety. 20 capsule 0     melatonin 3 mg Tab tablet Take 1 tablet (3 mg total) by mouth at bedtime as needed.  0     ondansetron (ZOFRAN-ODT) 4 MG disintegrating tablet Take 4 mg by mouth every 8 (eight) hours as needed for nausea.       predniSONE (DELTASONE) 10 mg tablet Take 4  tabs daily for 2  days, then 3  tabs daily for 2 days, then 2  tabs daily for 2 days, then 1  tab daily for 2  days, then stop. 20 tablet 0     tiZANidine (ZANAFLEX) 4 MG tablet Take 2 tablets (8 mg total) by mouth every 6 (six) hours as needed (headaches). 15 tablet 0     zolpidem (AMBIEN) 10 mg tablet Take 1 tablet (10 mg total) by mouth at bedtime as needed for sleep. 3 tablet 0     No current facility-administered medications on file prior to visit.      Allergies   Allergen Reactions     Ketorolac Anaphylaxis     Annotation: Throat swollen       Lithium Carbonate      seizures     Nsaids (Non-Steroidal Anti-Inflammatory Drug)      To avoid     Lomotil  [Diphenoxylate-Atropine] Rash     Loperamide Rash     Sumatriptan Headache and Palpitations     Severe headache     Topiramate Rash     OB History     No data available        I have reviewed the patient's medical history in detail and updated the computerized patient record.  OBJECTIVE:  Wt Readings from Last 3 Encounters:   11/29/18 (!) 254 lb (115.2 kg)   11/29/18 (!) 253 lb 1.6 oz (114.8 kg)   09/12/18 (!) 250 lb (113.4 kg)     Temp Readings from Last 3 Encounters:   11/29/18 98.6  F (37  C) (Oral)   09/12/18 98.4  F (36.9  C) (Oral)   09/06/18 98.3  F (36.8  C) (Oral)     BP Readings from Last 3 Encounters:   11/29/18 130/80   11/29/18 128/62   09/12/18 113/70     Pulse Readings from Last 3 Encounters:   11/29/18 90   11/29/18 87   09/12/18 78     Body mass index is 39.78 kg/m .     Alert, cooperative, well-hydrated. Appears well.  Eyes: Pupils equal, round, reactive to light.  HEENT: Sclera white, nares patent, MMM.  Neck: supple, without lymphadenopathy,  Thyroid freely movable and without hypotrophy or nodularity.   Lungs: Clear to auscultation. No retractions, no increased work of respiration, equal chest rise.   Heart: Regular rate and rhythm, no murmurs, clicks,   Gallops.  Abdomen: Soft, bowel sounds in 4 quadrants with no tenderness to palpation, no organomegaly or masses, no aortic or renal bruits.  Extremities: no tenderness to palpation of gastrocnemius, bilaterally.  Skin: no increased warmth, edema, or erythema of lower legs bilaterally.  Back: No cervical, thoracic or lumbar tenderness to spinous processes or musculature.  Neuro::pupils equal and reactive to light bilaterally, CN II - XII grossly intact. No focal motor/sensory deficits. DTR 2/4 all 4 extremities. Muscle Strength 5/5 all extremities.  Mood: Affect flat, pressured speech, mild psychomotor agitation, no suicidal or homicidal ideations, does make eye contact.  Labs:  Office Visit on 11/29/2018   Component Date Value     Rapid Strep A Antigen 11/29/2018 No Group A Strep detected, presumptive negative    Admission on 08/31/2018, Discharged on 09/01/2018   Component Date Value     Sodium 08/31/2018 140      Potassium 08/31/2018 3.9      Chloride 08/31/2018 108*     CO2 08/31/2018 24      Anion Gap, Calculation 08/31/2018 8      Glucose 08/31/2018 73      Calcium 08/31/2018 9.3      BUN 08/31/2018 11      Creatinine 08/31/2018 0.66      GFR MDRD Af Amer 08/31/2018 >60      GFR MDRD Non Af Amer 08/31/2018 >60      WBC 08/31/2018 9.6      RBC 08/31/2018 3.66*     Hemoglobin 08/31/2018 10.2*     Hematocrit 08/31/2018 30.9*     MCV 08/31/2018 84      MCH 08/31/2018 27.9      MCHC 08/31/2018 33.0      RDW 08/31/2018 16.6*     Platelets 08/31/2018 420      MPV 08/31/2018 9.1      Neutrophils % 08/31/2018 54      Lymphocytes % 08/31/2018 35      Monocytes % 08/31/2018 6      Eosinophils % 08/31/2018 5      Basophils % 08/31/2018 0      Neutrophils Absolute 08/31/2018 5.2      Lymphocytes Absolute  08/31/2018 3.4      Monocytes Absolute 08/31/2018 0.5      Eosinophils Absolute 08/31/2018 0.5*     Basophils Absolute 08/31/2018 0.0    Admission on 08/30/2018, Discharged on 08/30/2018   Component Date Value     Sodium 08/30/2018 139      Potassium 08/30/2018 4.4      Chloride 08/30/2018 108*     CO2 08/30/2018 21*     Anion Gap, Calculation 08/30/2018 10      Glucose 08/30/2018 77      Calcium 08/30/2018 9.5      BUN 08/30/2018 17      Creatinine 08/30/2018 0.75      GFR MDRD Af Amer 08/30/2018 >60      GFR MDRD Non Af Amer 08/30/2018 >60      WBC 08/30/2018 11.5*     RBC 08/30/2018 3.89      Hemoglobin 08/30/2018 11.0*     Hematocrit 08/30/2018 33.1*     MCV 08/30/2018 85      MCH 08/30/2018 28.3      MCHC 08/30/2018 33.2      RDW 08/30/2018 16.8*     Platelets 08/30/2018 466*     MPV 08/30/2018 9.3      Neutrophils % 08/30/2018 62      Lymphocytes % 08/30/2018 29      Monocytes % 08/30/2018 4      Eosinophils % 08/30/2018 4      Basophils % 08/30/2018 1      Neutrophils Absolute 08/30/2018 7.1      Lymphocytes Absolute 08/30/2018 3.3      Monocytes Absolute 08/30/2018 0.5      Eosinophils Absolute 08/30/2018 0.4      Basophils Absolute 08/30/2018 0.1    Admission on 08/26/2018, Discharged on 08/30/2018   Component Date Value     VENTRICULAR RATE 08/26/2018 68      ATRIAL RATE 08/26/2018 68      P-R INTERVAL 08/26/2018 126      QRS DURATION 08/26/2018 100      Q-T INTERVAL 08/26/2018 416      QTC CALCULATION (BEZET) 08/26/2018 442      P Amasa 08/26/2018 10      R AXIS 08/26/2018 18      T AXIS 08/26/2018 36      MUSE DIAGNOSIS 08/26/2018                      Value:Normal sinus rhythm  Normal ECG  When compared with ECG of 21-AUG-2017 20:12,  No significant change was found  Confirmed by EVER AYALA, LANDON LOC:WW ([id number]) on 8/27/2018 12:00:02 PM       Sodium 08/26/2018 140      Potassium 08/26/2018 4.2      Chloride 08/26/2018 108*     CO2 08/26/2018 19*     Anion Gap, Calculation 08/26/2018 13      Glucose  08/26/2018 78      Calcium 08/26/2018 9.8      BUN 08/26/2018 13      Creatinine 08/26/2018 0.77      GFR MDRD Af Amer 08/26/2018 >60      GFR MDRD Non Af Amer 08/26/2018 >60      WBC 08/26/2018 8.6      RBC 08/26/2018 4.09      Hemoglobin 08/26/2018 11.3*     Hematocrit 08/26/2018 35.3      MCV 08/26/2018 86      MCH 08/26/2018 27.6      MCHC 08/26/2018 32.0      RDW 08/26/2018 16.8*     Platelets 08/26/2018 475*     MPV 08/26/2018 9.7      Amphetamines 08/27/2018 Screen Negative      Benzodiazepines 08/27/2018 Screen Positive (Confirmation available on request)*     Opiates 08/27/2018 Screen Negative      Phencyclidine 08/27/2018 Screen Negative      THC 08/27/2018 Screen Negative      Barbiturates 08/27/2018 Screen Negative      Cocaine Metabolite 08/27/2018 Screen Negative      Oxycodone 08/27/2018 Screen Negative      Creatinine, Urine 08/27/2018 94.6      Color, UA 08/27/2018 Yellow      Clarity, UA 08/27/2018 Clear      Glucose, UA 08/27/2018 Negative      Bilirubin, UA 08/27/2018 Negative      Ketones, UA 08/27/2018 Negative      Specific Gravity, UA 08/27/2018 1.018      Blood, UA 08/27/2018 Negative      pH, UA 08/27/2018 5.5      Protein, UA 08/27/2018 Negative      Urobilinogen, UA 08/27/2018 <2.0 E.U./dL      Nitrite, UA 08/27/2018 Negative      Leukocytes, UA 08/27/2018 Negative      Sodium 08/27/2018 137      Potassium 08/27/2018 4.4      Chloride 08/27/2018 109*     CO2 08/27/2018 19*     Anion Gap, Calculation 08/27/2018 9      Glucose 08/27/2018 117      Calcium 08/27/2018 9.3      BUN 08/27/2018 13      Creatinine 08/27/2018 0.59*     GFR MDRD Af Amer 08/27/2018 >60      GFR MDRD Non Af Amer 08/27/2018 >60      Hemoglobin 08/27/2018 10.3*     ASSESMENT/PLAN:  1. Depression with anxiety  Thyroid Cascade    HM1(CBC and Differential)    Comprehensive Metabolic Panel    HM1 (CBC with Diff)   Greater than 50% of visit was used to determine patient's safety, treatment plan, discuss options for care.   Discussed patient's current symptoms and will start her back on Lexapro since she has had this medication before and did not have side effects from this medication.  We will have her follow-up in 2 weeks given the severity of her PHQ 9 score which was 25, but  she denies suicidal and homicidal ideations..  Advised that she must establish a verbal contract with she would keep in contact with a daily about her symptoms and she will do this with her father-in-law who she lives with.  Psychiatry consult order for her and get her established again in care for this and for PTSD symptoms which seem to be more severe currently and so that she can return to counseling.  All questions were answered, she voices understanding.  Grace Hernandez, MS, PA-C 11/29/18
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
## 2021-06-22 NOTE — PROGRESS NOTES
Assessment/Plan:        Diagnoses and all orders for this visit:    Current moderate episode of major depressive disorder without prior episode (H)    Anxiety    Need for vaccination  -     Influenza, Seasonal Quad, Preservative Free 36+ Months    Other orders  -     escitalopram oxalate (LEXAPRO) 20 MG tablet; Take 1 tablet (20 mg total) by mouth daily.  Dispense: 30 tablet; Refill: 2    Two lexapro to equal 20 mg daily starting today.  Take 2 hydroxizine at night.  Take one diazepam twice daily as needed for anxiety.  Call when diazepam needs to be refilled, will consider alprazolam at that point.  Concerned about patient's need for a specific short acting anxiolytic, will try increasing Lexapro, she is also advised to take hydroxyzine at night to help with sleep.  Discussed this plan of care with Dr. Almaraz and was advised that she may need to be on once or twice a week refills of short amounts of this medication, and also should return with clonazepam pills it is unclear whether she still has this medication at home.        Subjective:    Patient ID: Amena Renteria is a 42 y.o. female.    HPI  Chief Complaint   Patient presents with     Anxiety     Right mid back  pain wrapping around to abdomen     constant pain, up all night, nauseated from the pain, sx started a couple day's ago       Amena Renteria returns for recheck after starting clonazepam and Lexapro for anxiety and depression.  States that she is still having severe anxiety.  She started the clonazepam but only took 2 tablets and states that she broke out in a rash with hives.  Very itchy. Went to urgent care and got diazepam instead.  She initially states that she throughout her clonazepam but then she states that she gave it to her father-in-law to take to the pharmacy when he picked up the diazepam.  She states the diazepam does not seem to be helping her sleep, she has been taking 1 tablet at night, as instructed by urgent care.  States she has 10  "tablets left.  She has not been taking hydroxyzine.  States this did not work in the past.  She is also tried BuSpar in the past and states that it did not work.  Psychiatric appointment on December 14, 2018.  Review of Systems          Objective:    Physical Exam  /80 (Patient Site: Right Arm, Patient Position: Sitting, Cuff Size: Adult Large)   Pulse 62   Temp 97.7  F (36.5  C) (Oral)   Resp 20   Ht 5' 7\" (1.702 m)   Wt (!) 254 lb (115.2 kg)   LMP 11/20/2018 (Exact Date)   SpO2 100%   BMI 39.78 kg/m    Mood: Good eye contact, flat affect, some psychomotor agitation, no pressured speech, but no suicidal or homicidal ideations.        "

## 2021-06-22 NOTE — PROGRESS NOTES
"Brief Diagnostic Assessment    Patient Name: Amena Renteria     Age: 42 y.o.      YOB: 1975    Date: 2018    Start Time:11:00     Stop Time: 12:00    Referring Provider:  Grace Hernandez PA-C                                               Persons Present:  Asim Burr [facility]; [facility]    Session Type: Patient is presenting for an Individual session.    Recipient's description of symptoms (including reason for referral):Patient reports having been experiencing panic attacks, anxiety, depression which have been manifestating for many years but having been gotten worse at least in the last several months. She notes the last 3 months were hard for her. She notes she feels like she is losing her mind or like she is on verge of having a break down. She notes she has not seen a psychiatrist for the last 3 years. Was seen at [facility] and [facility] but stopped one year after completing her treatment for OxyContin at [facility] for 3 years and after her psychiatrist diagnosed her with bipolar disorder. She stated this was a wrong diagnosis and everyone said she was misdiagnosed. So decided to leave the clinic. She reports she had been treated for panic attacks, anxiety and depression.    Patient had had a back injury while working on her car when she was 21. She was treated with opioids and became addicted to them.  Patient can't pin point the reason she is back thinking much about her father who passed away 14 years ago. She notes she gets worse thinking about him as he was the \"glue for the family\" and was her best friend.  Father  of blood clots just after turning 52.  Patient also notes her 6 year old daughter who has autism and ADHD has been too demanding of her energy which makes her anxiety even worse. She has been living with her fiance and his family in a every small trailer. She notes this increased anxiety and irritability.   She reports symptoms that include rapid heart pounding, " trembling, nausea/vomiting, headaches, inability to sleep, decreased appetite, communication problems, distrust of others, problems with mother, needs excessive advice, problems with relatives, decreased social activities, loss of interest in activities. Temper outburst, forgetful, anxious, angry, nervious, irritable, boredom, excessive fears, depressed mood, emptiness, boredom, mood swings, lack of self confidence, and feelings of shame.     Mental Health History (Include review of records, or ALEKSANDAR to obtain, previous outpatient psychotherapy, hospitalizations, commitments, psychiatry, etc): Patient reports she has been treated for depressed and anxiety at [facility]. She has signed a ALEKSANDAR for her medical records.     Mental Status Evaluation:    Grooming: Well groomed  Attire: Appropriate  Age: Appears Stated  Behavior Towards Examiner: Cooperative  Motor Activity: Within normal   Eye Contact: Appropriate  Mood: Euthymic  Affect: Anxious  Speech/Language: Pressured  Attention: Within normal  Concentration: Within normal  Thought Process: Within normal  Thought Content: No hallucination noted or reported  No delusion noted nor reported  Orientation: X 3No Evidence of Impairment  Memory: No Evidence of Impairment  Judgement: No Evidence of Impairment  Estimated Intelligence: Average  Demonstrated Insight: Adequate  Fund of Knowledge: adequate  Suicidal Ideation: No    Cultural influences and impact:(This is more than race or ethnicity , see manual for definition): patient reports she was born and grew up in [place]. She recalls being happy until her father's death 14 years ago.She described him as the rock. She recalls having wonderful childhood and had great memories. Patient recalls getting along with everyone, siblings and parents. She also remembers finding herself on the other side of the road alone after losing her father because she started making choices that they did not approve. She did not  "finish her college due to back pain.  There are some MH issues in the family. Mother has depression and anxiety. Maternal grand mother has bipolar disorder. Her cousin  of OD in . Many people in the family use alcohol and drugs. Mother is a cancer survivor. Grand father  of heart attack.     Clinical Summary    Strengths: \" I don't have strengths\"  She states the limitations are her MH symptoms.     Cultural influences: Patient reports she is not able to navigate the system on her own. She has a CM with [employer]. Though has not seen her for awhile.     Life situations: patient has a child who has autism and ADHD. Patient reports living in a very small place with many other people on lindsay's side. Notes this makes her mental health worse and her daughter's own mental health worse. Patient has a MH  with [employer].     Relationships: Patient shared she has a fiance for the last 4 years. They have been together for the last 7 years.  She lives with her 6 year old daughter with autism and ADHD, her eulaance, lindsay's family( parents and sister) in a 3 bedroom trailer. Patient lost her father 14 years ago. She notes he was the family anchor. Things got bad for her since they were very close.  Patient has a  mother and siblings( 2 brothers and 2 sisters but none talks to her due to \"  My use in the past and the life I have chosen. They don't like my lindsay's family but they are the ones who kept me away from opioids\"    Health concern: Per chart, patient experiencing many medical issues some of which include anemia, opioid dependence with continuous use,  Lower back pain chronic, Sciatica, history of seizures, lumbar spondylosis, esophageal reflux, chest pain, hearing loss,peripheral neuropathy,insomnia, lyme disease, obesity,intracranial hypotension. A list of the patient's medical issues is in the patient's. See notes of her PCP of 2018. Patient reports she has been dealing with " back pain since she broke it when she was 21. She shared this was the reason she ended up being addicted to opioids.     How Dx interacts or impacts with client s life: patient reports having many issues related to her mental health. She shared she has not been able to work and has been on SSDI since 2009.  She reports her depression and anxiety take over when she has financial difficulties. Patient stated she had difficulties to meet her 6 year old daughter with autism and ADHD due to her anxiety and depression.  Patient also notes she has been thinking much about her father much more and feels it was like yesterday even though he passed 14 years ago. She notes she does not know why it is the case again. She notes these memories make her depression anxiety worse. She also stated due to back injury she was never able to complete her college. Stated she completed 2 years at [employer] and some training to become .     Cause, prognosis, likely consequences of symptoms:Patient shared she injured her back while she was working on her car. She was 21. Slowly she got sicker. She then started using more oxycodone prescribed for her back pain. Then when her father passed away 14 years ago, patient had panic attacks and depression.She said she was doing better on Lexapro and diazepam for panic attacks and depression around those times. Then her back pain returned after having her 6 year old daughter. She went to [facility] for methadone maintenance. She reports having had stopped medications after completing her treatment for opioids abuse. Currently she reports difficulties to handle her 6 year old daughter with autism. She does not work due to back injury that led to depression and anxiety. She has been having difficulties with the family. None is on her side due to bad choices she has made. She only gets support from her fiance of the last 4 years. Though reports some discomfort living with many  "people in a small trailer( her own autistic daughter with ADHD, lindsay and his sister, her lindsay's parents). Per chart review, it also was noted there are some issues with her lindsay. Patient has a  CM with [employer].     All was going well until the last 3 or 4 months where her back pain returned. She stated last 2 weeks things got out of control. She notes doing much better from getting back on medications but was given only up to today. Since she is not seeing a psychiatrist today, she was recommended to return to her PCP for her medications until she gets to be seen next February.  Patient was being seen by a provider at [facility]. She left the clinic because she felt she was doing well and her previous psychiatrist \" assumed that I had bipolar disorder. This is not true and everyone around me agrees that I don't have bipolar disorder\" She has signed a ALEKSANDAR for her medical records.     Prioritization of needed mental Health ancillary or other services: Patient was referred for psychiatric care. She was scheduled to see Linnea at   on February 4th. Also, writer discussed with the patient about the benefits of psychotherapy and choice was given to her to decide whether she is ready for psychotherapy.     How Diagnostic criteria is met: (Include symptoms, frequency, duration, functional impairments): Writer used today's 1:1 intake interview, chart review, mental status, and screening tools(MELANIE-7; PHQ; WHODAS 2.0; C-SSRS; CAGE AID). The results from each tool used are included in this assessment).  Patient has indicated she was seen else where for psychiatry. She has signed a ALEKSANDAR to request her medical records with [facility]. She was seen for methadone maintenance at [facility]. She will bring contact information to sign a ALEKSANDAR when she sees her new psychiatrist.     Explanation for any provisional diagnosis why alternative diagnosis was considered and ruled out: None identified at " this time.     Recommendations (treatment, referrals, services needed):   1.Keep the referral for psychiatry  2.Consider psychotherapy when ready  3. Continue to work with   with [employer]    Diagnosis (non-Axial as defined in DSM-5)  1.Adjustment disorder with mixed anxiety and depressed mood- also in chart  2. Generalized anxiety disorder with panic attacks-Also in chart    Provisional diagnostic hypothesis: None identified    WHODAS:  (12 or 36 item): 56 %  CAGE-AID: 0/4  Has completed her methadone maintenance at [facility] 3-4 years ago.she also stated she was treated at [facility]( inpatient) for alcohol and OxyContin.  Reports she has been sober for 4 years. Though reports having had 4 beers yesterday while working in the [place].   PHQ-9: 21  MELANIE-7: 18  C-SSRS: 0    Therapist s Signature/Supervisor/co-signature statement:   Performed and documented by DARIAN Jack- ELANA; [facility] 12/14/2018

## 2021-06-22 NOTE — PROGRESS NOTES
Attempt 2: Care Guide called patient.  If this patient is returning my call, please transfer to Thalia Mares at ext [phone].
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
## 2021-06-23 NOTE — TELEPHONE ENCOUNTER
Controlled Substance Refill Request  Medication:   Requested Prescriptions     Pending Prescriptions Disp Refills     ALPRAZolam (XANAX) 0.5 MG tablet [Pharmacy Med Name: ALPRAZOLAM 0.5MG TABLETS] 30 tablet 0     Sig: TAKE 1 TABLET(0.5 MG) BY MOUTH THREE TIMES DAILY AS NEEDED FOR SLEEP OR ANXIETY     Date Last Fill: 1/2/19  Pharmacy: [id number]   Submit electronically to pharmacy  Controlled Substance Agreement on File:   Encounter-Level CSA Scan Date:    There are no encounter-level csa scan date.       Last office visit: Last office visit pertaining to requested medication was 1/2/19.

## 2021-06-23 NOTE — PROGRESS NOTES
Notified by [facility], that patient has moved to [place], [place], No further assistance needed from [facility]

## 2021-06-23 NOTE — TELEPHONE ENCOUNTER
Who is calling:  Patient   Reason for Call: Patient is requesting a return phone call from provider. Patient states she had to move to [place], and has questions as to who she should establish care with in [place]. Also has questions in regards to her medications. Patient did not provide any additional details.   Date of last appointment with primary care: 01/02/2019  Has the patient been recently seen:  Yes  Okay to leave a detailed message: No

## 2021-06-23 NOTE — TELEPHONE ENCOUNTER
I can refill medications today for her. Please clarify what questions she has as I'm not sure I can call her back today. Will likely have time tomorrow.

## 2021-06-23 NOTE — TELEPHONE ENCOUNTER
Left message for patient and will try to contact again later. If calls back I did send xanax to pharmacy for her and have thought about adding Buspar to regimen if she would like. Also would recommend therapy appointment if she could get in as this might be helpful.    I'll try to call again later this afternoon.

## 2021-06-23 NOTE — PROGRESS NOTES
Outpatient Mental Health Treatment Plan    Name:  Amena Renteria  :  1975  MRN:  [id number]    Treatment Plan:  Initial Treatment Plan  Intake/initial treatment plan date: 2019  Benefit and risks and alternatives have been discussed: Yes  Is this treatment appropriate with minimal intrusion/restrictions: Yes  Estimated duration of treatment:  10 +  Anticipated frequency of services:  Every week  Necessity for frequency: This frequency is needed to establish therapeutic goals and for continuity of care in order to monitor progress.  Necessity for treatment: To address cognitive, behavioral, and/or emotional barriers in order to work toward goals and to improve quality of life.    Plan:         ?   ? Anxiety    Goal:  Decrease average anxiety level from 4 to 3.   Strategies: ? [x]Learn and practice relaxation techniques and other coping strategies (e.g., thought stopping, reframing, meditation)     ? [x] Increase involvement in meaningful activities     ? [x] Discuss sleep hygiene     ? [x] Explore thoughts and expectations about self and others     ? [x] Identify and monitor triggers for panic/anxiety symptoms     ? [x] Implement physical activity routine (with physician approval)     ? [] Consider introduction of bibliotherapy and/or videos     ? [x] Continue compliance with medical treatment plan (or explore  barriers)                                       Degree to which this is a problem (1-4): 4     Degree to which goal is met (1-4):1     Date of Next Review: 2019    ? Depression    Goal:  Decrease average depression level from 4 to 3.   Strategies:    ?[x] Decrease social isolation     [x] Increase involvement in meaningful activities     ?[x] Discuss sleep hygiene     ?[x] Explore thoughts and expectations about self and others     ?[x] Process grief (loss of significant person, independence, role, etc.)     ?[x] Assess for suicide risk     ?[x] Implement physical activity routine (with  "physician approval)     [] Consider introduction of bibliotherapy and/or videos     [x] Continue compliance with medical treatment plan (or explore  barriers)     Degree to which this is a problem (1-4): 4   Degree to which goal is met (1-4)1  Date of Next Review:4/11/2019    Functional Impairment: 1=Not at all/Rarely  2=Some days  3=Most Days  4=Every Day   Personal: 4  Family: 4  Social: 4  Work: 0    Diagnosis:  1.Adjustment disorder with mixed anxiety and depressed mood- also in chart  2. Generalized anxiety disorder with panic attacks-Also in chart     WHODAS 2.0 12-item version=20  H1= 20  H2= 15  H3=10    Clinical assessments completed: MELANIE-7: 18; PHQ-9: 21;  CAGE-AID: 0/4, C-SSRS: 0; WHODAS 2.0: 56 %    Strengths: \"Good mom; vanessa; thoughtful, caring, lovable.\"   Limitations:  \"Depression, anxiety\"  Cultural Considerations: . Has a CM through [employer]. Has health insurance and used her Yazidi Vanessa to fell better.     Persons responsible for this plan:  ? [x] Patient ? [x] Provider ? [] Other: __________________    Provider:Performed and documented by JAYSON Jack; [facility] 1/11/2019   Date:  1/11/2019  Time:  10:23 AM      Patient Signature:____________________________________ Date: ______________     Guardian Signature: __________________________________ Date: ______________     Therapist Signature: __________________________________ Date: ______________      "

## 2021-06-23 NOTE — PATIENT INSTRUCTIONS - HE
Goal reviewed and updated today 1/11/2019  1. Continue working with your PCP for medications until you get to see Linnea for psychiatric care  2. Use crisis lines provided as needed.  3. Return in a week for more support
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
## 2021-06-23 NOTE — PROGRESS NOTES
The Clinic Care Guide called the patient  today at the request of the PCP to discuss possible clinic care coordination enrollment. Clinic care coordination was described to the patient and immediate needs were discussed. The patient agreed to enrollment in clinic care coordination and future appointments were scheduled for an RN care coordination assessment and an enrollment visit with the primary care physician and care guide. The patient was provided with contact information for the clinic care guide.  Spoke to patient in length today with what CCC is and how she can benefit from it. Patient states that she is overwhelmed with everything going on right now as she has close to to no income, food support is a struggle and has many medial bills piling up.  Patient has requested a reminder call a few days prior to RN assessment on 1/31/19  PCA date 1/31/19 @ 1pm with [facility] RN Juanita

## 2021-06-23 NOTE — TELEPHONE ENCOUNTER
Patient wants help finding a provider in [place], and is wondering if Desirae knew of any one in that area. That is patients main question/concern. She needs to set up a pcp, psyc, and possibly ortho? Patient is needing Xanax and Cymbalta refilled and sent to same pharmacy ([address]

## 2021-06-23 NOTE — PROGRESS NOTES
Writer called the patient since she was no show to her scheduled appt.  This is her 3 rd no show. Patient was in [facility]. Has left to temporally live with her brother there. She stated she has called to cancel all her appts today. It does not look like anyone here was informed.

## 2021-06-23 NOTE — PROGRESS NOTES
Patient was a no show again . She was a no show las week as well. A warning note to close is appropriate.

## 2021-06-23 NOTE — PROGRESS NOTES
"Writer tried calling patient to do telephone RN assessment instead because patient didn't show up for 1pm appt with CCC RN today. Patient answered her phone right away. Patient states \" I called the clinic yesterday and cancelled the appt with CCC RN\". Writer reviewed patient's chart and saw telephone encourter dated yesterday came in from patient requesting med refill and cancelling CCC RN appt today.    Patient states she had to move quickly to [place] and moved in with her brother. Didn't say why. States she's not sure it will be permanent or short stay. Patient states she will establish a new PCP here at [place]. Will not need CCC assist. States her sister in law a  and she can assist her with resources for housing or other resources as needed.     Instructed patient if she decided to come back here to the [facility] and to let PCP here know if she needs assist from [facility]     Patient verbalized understanding.     Patient thanked writer.   "

## 2021-06-23 NOTE — TELEPHONE ENCOUNTER
Can give a short term supply of medication today. Will send 15 tablets for patient to use as needed. Patient was on the schedule but not any longer? Could also add controller medication such as buspar to regimen if she is interested in doing that as well.
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
## 2021-06-23 NOTE — TELEPHONE ENCOUNTER
Patient is looking for refills of medications. She is leaving for [place] tomorrow.   She would [facility] [address]   She is also canceling tomorrow's appointment with [facility] RN assessment as she will be leaving for [place] @ 8am, and has no transportation to the clinic.   Controlled Substance Refill Request  Medication: Alprazolam  Requested Prescriptions     Pending Prescriptions Disp Refills     DULoxetine (CYMBALTA) 60 MG capsule 30 capsule 2     Sig: Take 1 capsule (60 mg total) by mouth daily.     ALPRAZolam (XANAX) 0.5 MG tablet 15 tablet 0     Sig: Take 1 tablet (0.5 mg total) by mouth 3 (three) times a day as needed for sleep or anxiety.     Date Last Fill: 1/22/2019 # 15  Pharmacy: [facility] [address]  Submit electronically to pharmacy  Controlled Substance Agreement on File:   Encounter-Level CSA Scan Date:    There are no encounter-level csa scan date.       Last office visit: 1/2/2019: Last office visit pertaining to requested medication was 1/2/2019 with A Moriah CASTANEDA

## 2021-06-23 NOTE — TELEPHONE ENCOUNTER
Patient calling.    She is reporting that she does not have a ride today to her appointment with Desirae Major.  She is reporting that she would very much like it if Desirae will call her .    Her brother passed away 4 days ago, and   Other recent tragic events have occurred       Please call patient request.    Estrella Arroyo RN  [facility] Triage/refill nurse                Reason for Disposition    Recent death of a loved one    Mild depression    Protocols used: DEPRESSION-A-[place]

## 2021-06-23 NOTE — TELEPHONE ENCOUNTER
Who is calling:  Patient  Reason for Call:  Wants to address why she was no show today.  Also to discuss anxiety and depression medication    Date of last appointment with primary care: NA  Has the patient been recently seen:  NA  Okay to leave a detailed message: Yes

## 2021-06-23 NOTE — TELEPHONE ENCOUNTER
Left message encouraged her to call the clinic at [phone]  Told her that I would like to  Communicate with her to see what her goals are and to talk about with the barriers have been to her not showing up for her therapy appointments her primary care appointments and her psychiatry appointment      Encouraged her to reach out to the provider and figure out how we get things back on track for her to feel better

## 2021-06-23 NOTE — PATIENT INSTRUCTIONS - HE
There were no signs of pneumonia on your physical exam.    Your symptoms are most likely due to acute bronchitis.  This is inflammation of the tubes leading into the lungs, most often due to a viral infection, but since this has been going on for so long we will cover for bacterial bronchitis.    I have prescribed an albuterol inhaler to help with the cough/wheezing.    May take Tessalon Perles as needed for cough.  May also try to use Mucinex or Robitussin.    Please monitor symptoms carefully.  If developing chest pain, shortness of breath, fever, coughing up blood, extreme fatigue, or any other new, concerning symptoms, come back to clinic or go to ER immediately.  Otherwise, if no improvement in symptoms in one week, follow-up with your primary care provider.
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
## 2021-06-23 NOTE — TELEPHONE ENCOUNTER
Refill Approved    Rx renewed per Medication Renewal Policy.  Medication was last renewed on 12/18/2018.  Last OV 1/2/2019.    Maria Del Carmen Paige, [facility] Triage/Med Refill 1/21/2019     Requested Prescriptions   Pending Prescriptions Disp Refills     hydrOXYzine pamoate (VISTARIL) 25 MG capsule 30 capsule 0     Sig: Take 1-2 capsules (25-50 mg total) by mouth every 6 (six) hours as needed for itching or anxiety.    Antihistamine Refill Protocol Passed - 1/21/2019  9:42 AM       Passed - Patient has had office visit/physical in last year    Last office visit with prescriber/PCP: 1/2/2019 Desirae Major FNP OR same dept: 1/2/2019 Desirae Major FNP OR same specialty: 1/2/2019 Desirae Major FNP  Last physical: Visit date not found Last MTM visit: Visit date not found   Next visit within 3 mo: Visit date not found  Next physical within 3 mo: Visit date not found  Prescriber OR PCP: LEONEL Jackson  Last diagnosis associated with med order: 1. Anxiety  - hydrOXYzine pamoate (VISTARIL) 25 MG capsule; Take 1-2 capsules (25-50 mg total) by mouth every 6 (six) hours as needed for itching or anxiety.  Dispense: 30 capsule; Refill: 0    If protocol passes may refill for 12 months if within 3 months of last provider visit (or a total of 15 months).

## 2021-06-24 NOTE — PROGRESS NOTES
Discharge Summary    Dates of Service: 1/02/2019 and 1/11/2019 # of sessions completed: 2    Diagnosis at Intake:  1.Adjustment disorder with mixed anxiety and depressed mood- also in chart  2. Generalized anxiety disorder with panic attacks-Also in chart       Diagnosis at Discharge:  1.Adjustment disorder with mixed anxiety and depressed mood- also in chart  2. Generalized anxiety disorder with panic attacks-Also in chart    Progress toward goal 1: unmet, partially met, met: Unmet    Progress toward goal 2: unmet, partially met, met: Unmet    Additional goals:Not discussed    Additional comments: Patient has indicated that she is now living in [facility] with his brother. She also has 3 No Shows.     Reason for discharge: Patient dropped out   Goals partially met      Goals met    Other: Patient dropped out; she has moved to [place] [facility] [place] and has 3 no shows.     Prognosis for discharge: poor     Guarded     Good : Poor    Recommendations and referrals at discharge: Patient was encouraged to continue working with her psychiatrist as this termination regards on psychotherapy.       Performed and documented by DARIAN Jack- ELANA; [facility] 2/14/2019    ___________________________________________________      Date_____________  Psychotherapist

## 2021-06-24 NOTE — TELEPHONE ENCOUNTER
Controlled Substance Refill Request  Medication Name:   Requested Prescriptions     Pending Prescriptions Disp Refills     ALPRAZolam (XANAX) 0.5 MG tablet 30 tablet 0     Sig: Take 1 tablet (0.5 mg total) by mouth 3 (three) times a day as needed for sleep or anxiety.     Date Last Fill: 1/31/19  [facility]       Submit electronically to pharmacy  Controlled Substance Agreement Date Scanned:   Encounter-Level CSA Scan Date:    There are no encounter-level csa scan date.       Last office visit with prescriber/PCP: 1/2/2019 Desirae Major FNP OR jenn dept: 1/2/2019 Desirae Major FNP OR same specialty: 1/2/2019 Desirae Major FNP  Last physical: Visit date not found Last MTM visit: Visit date not found

## 2021-06-24 NOTE — TELEPHONE ENCOUNTER
Patient Returning Call  Reason for call:  Caller returning call to check on the status of this request.  Information relayed to patient:  Pending providers review and approval.  Patient has additional questions:  No  If YES, what are your questions/concerns:  none  Okay to leave a detailed message?: Yes

## 2021-06-25 NOTE — PROGRESS NOTES
Progress Notes by Gautam Solo DO at 10/11/2017  4:00 PM     Author: Gautam Solo DO Service: -- Author Type: Physician    Filed: 10/12/2017 10:57 AM Encounter Date: 10/11/2017 Status: Signed    : Gautam Solo DO (Physician)       Chief Complaint   Patient presents with   ? Cough     x7 days cough fever, chest congestion     History of Present Illness: Nursing notes reviewed. Patient had a fever up to 104.3 F yesterday. She starts to cough with deeper breaths.    Review of systems: See history of present illness, otherwise negative.     Current Outpatient Prescriptions   Medication Sig Dispense Refill   ? albuterol (PROAIR HFA;PROVENTIL HFA;VENTOLIN HFA) 90 mcg/actuation inhaler Inhale 2 puffs every 4 (four) hours as needed for wheezing or shortness of breath. 1 each 0   ? albuterol (PROVENTIL HFA;VENTOLIN HFA) 90 mcg/actuation inhaler Inhale 2 puffs every 4 (four) hours as needed for wheezing or shortness of breath (or cough). 18 g 1   ? albuterol (PROVENTIL) 2.5 mg /3 mL (0.083 %) nebulizer solution Take 3 mL (2.5 mg total) by nebulization every 4 (four) hours as needed for wheezing or shortness of breath (or cough). 30 vial 1   ? azithromycin (ZITHROMAX) 250 MG tablet Take 500 mg (2 x 250 mg tablets) on day 1 followed by 250 mg (1 tablet) on days 2-5. 6 tablet 0   ? benzonatate (TESSALON PERLES) 100 MG capsule Take 1 capsule (100 mg total) by mouth every 6 (six) hours as needed for cough. 20 capsule 0   ? bisacodyl (DULCOLAX) 10 mg suppository Insert 10 mg into the rectum.     ? busPIRone (BUSPAR) 30 MG tablet Take 30 mg by mouth 2 (two) times a day.     ? gabapentin (NEURONTIN) 600 MG tablet Take 600 mg by mouth 3 (three) times a day.     ? lurasidone 80 mg Tab Take 60 mg by mouth.      ? methadone (DOLOPHINE) 10 mg/5 mL solution Take 130 mg by mouth daily.     ? omeprazole (PRILOSEC) 40 MG capsule Take 40 mg by mouth daily.     ? sucralfate (CARAFATE) 100 mg/mL suspension Take 500 mg by mouth 4  (four) times a day.     ? vilazodone (VIIBRYD) 40 mg Tab tablet Take 40 mg by mouth.     ? zolpidem (AMBIEN CR) 12.5 MG CR tablet Take 12.5 mg by mouth.       No current facility-administered medications for this visit.        Past Medical History:   Diagnosis Date   ? Anxiety    ? Chemical dependency    ? Chronic back pain    ? Depression    ? Hx of gastric bypass    ? Kidney stone    ? PTSD (post-traumatic stress disorder)       Past Surgical History:   Procedure Laterality Date   ? APPENDECTOMY     ?  SECTION     ? CHOLECYSTECTOMY     ? GA ARTHRODESIS ANT INTERBODY MIN DISCECTOMY,LUMBAR      Description: Lumbar Vertebral Fusion;  Recorded: 2012;  Annotations: Lumbar Fusion L3-L5 in .   ? GA CYSTO/URETERO/PYELOSCOPY, DX      Description: Cystoscopy With Ureteroscopy;  Recorded: 2012;  Annotations: stenting in  for nephrolithiasis.   ? GA GASTRIC BYPASS,OBESE<150CM CASSIA-EN-Y      Description: Gastric Surgery For Morbid Obesity Bypass With Cassia-en-Y;  Recorded: 2013;   ? GA VARGAS W/O FACETEC FORAMOT/[facility]  VRT SEG, CERVICAL      Description: Laminectomy Lumbar;  Recorded: 2012;  Annotations: L5-S1 lami in , and again in  when she re-injured her back.   ? GA TMJ ARTHROSCOPY/DIAGNOSTIC      Description: Arthroscopy Temporomandibular Joint;  Proc Date: 2003;      Social History     Social History   ? Marital status: Single     Spouse name: N/A   ? Number of children: 1   ? Years of education: N/A     Social History Main Topics   ? Smoking status: Current Every Day Smoker     Packs/day: 0.50     Types: Cigarettes   ? Smokeless tobacco: Never Used   ? Alcohol use Yes   ? Drug use: No   ? Sexual activity: Yes     Partners: Male     Birth control/ protection: Implant, Injection     Other Topics Concern   ? None     Social History Narrative    Currently staying at a recovery center for drug addiction and alcoholism.       History   Smoking Status   ? Current Every Day  Smoker   ? Packs/day: 0.50   ? Types: Cigarettes   Smokeless Tobacco   ? Never Used      Exam:   Blood pressure 110/80, pulse 99, temperature 99.8  F (37.7  C), temperature source Oral, resp. rate 20, weight (!) 269 lb 9.6 oz (122.3 kg), last menstrual period 09/28/2017, SpO2 97 %, not currently breastfeeding.    EXAM:   General: Vital signs reviewed. Patient is in no acute appearing distress except for occasional cough. Breathing is non labored appearing. Patient is alert and oriented x 3.   ENT: Ear exam shows clear TMs with no injection, left nasal turbinates are injected and edematous, no pharyngeal injection noted.  Neck: supple with no adenopathy.  Heart:  Heart rate is normal, regular rhythm without murmur.  Lungs:  Lung sounds are clear to auscultation with good airflow except for rhonchi noted in the right middle lobe region.  Skin: febrile and mildly diaphoretic    Assessment/Plan   1. Right middle lobe pneumonia  azithromycin (ZITHROMAX) 250 MG tablet    albuterol (PROAIR HFA;PROVENTIL HFA;VENTOLIN HFA) 90 mcg/actuation inhaler   2. Sinusitis  azithromycin (ZITHROMAX) 250 MG tablet   3. Cough  benzonatate (TESSALON PERLES) 100 MG capsule       Patient Instructions     Also see info below. Be seen again in 3 days if symptoms are not better, sooner if feeling any worse.      When You Have Pneumonia  You have been diagnosed with pneumonia. This is a serious lung infection. Most cases of pneumonia are caused by bacteria. Pneumonia most often occurs in older adults, young children, and people with chronic health problems.  Home care    Take your medicine exactly as directed. Dont skip doses. Continue taking your antibiotics as until they are all gone, even if you start to feel better. This will prevent the pneumonia from coming back.    Drink at least 8 glasses of water daily, unless directed otherwise. This helps to loosen and thin secretions so that you can cough them up.    Use a cool-mist humidifier in  your bedroom. Be sure to clean the humidifier daily.    Dont use medicines to suppress your cough unless your cough is dry, painful, or interferes with your sleep. Coughing up mucus is normal. You may use an expectorant if your healthcare provider says its okay.    You can use warm compresses or a heating pad on the lowest setting to relieve chest discomfort. Use several times a day for 15-20 minutes at a time. To prevent injury to your skin, set the temperature to warm, not hot. Dont put the compress or pad directly on your skin. Make certain it has a cover or wrap it in a towel. This is to prevent skin burns.    Get plenty of rest until your fever, shortness of breath, and chest pain go away.    Plan to get a flu shot every year. The flu is a common cause of pneumonia. Getting a flu shot every year can help prevent both the flu and pneumonia.  Getting the pneumococcal vaccine  Talk with your healthcare provider about getting the pneumococcalvaccine. Pneumococcal pneumonia is caused by bacteria that spread from person to person. It can cause minor problems, such as ear infections. But it can also turn into life-threatening illnesses of the lungs (pneumonia), the covering of the brain and spinal cord (meningitis), and the blood (bacteremia).  Children under 2 years of age, adults over age 65, people with certain health conditions, and smokers are at the highest risk of pneumococcal disease. This vaccine can help prevent pneumococcal disease in both adults and children. Some people should not have the vaccine. Make sure to ask your healthcare provider if you should have the vaccine.   Follow-up care  Make a follow-up appointment as directed by our staff.  When to call your healthcare provider  Call your healthcare provider if you have any of the following:    Fever above 100.4 F (38 C), or as directed by your healthcare provider    Mucus from the lungs (sputum) thats yellow, green, bloody, or smells bad    A large  amount of sputum    Vomiting    Symptoms that get worse  When to call 911  Call 911 right away if you have any of the following:    Chest pain    Trouble breathing    Blue lips or fingernails   Date Last Reviewed: 11/1/2016 2000-2016 [employer]. [address] [place] [address], [place] [address]. All rights reserved. This information is not intended as a substitute for professional medical care. Always follow your healthcare professional's instructions.           Gautam Solo, DO

## 2021-06-26 NOTE — PROGRESS NOTES
Progress Notes by Devon Mejia PA-C at 2018  2:40 PM     Author: Devon Mejia PA-C Service: -- Author Type: Physician Assistant    Filed: 2018  3:12 PM Encounter Date: 2018 Status: Signed    : Devon Mejia PA-C (Physician Assistant)       Subjective:      Patient ID: Amena Renteria is a 42 y.o. female.    Chief Complaint:    HPI  Amena Renteria is a 42 y.o. female who presents today complaining of a long-standing 2-month history of cough that over the last 2 weeks has become worse.  Also the ears at started hurting over the last 2 weeks with sinus pain pressure and ear pain that radiates down around the angle of the jaw.  She has not had fever chills night sweats no vomiting or diarrhea.  She has sore throat that is intermittent over the last 2 weeks as well.  No overt otorrhea hearing or balance deficits.      Past Medical History:   Diagnosis Date   ? Anxiety    ? Chemical dependency (H)    ? Chronic back pain    ? Depression    ? Hx of gastric bypass    ? Kidney stone    ? PTSD (post-traumatic stress disorder)        Past Surgical History:   Procedure Laterality Date   ? APPENDECTOMY     ?  SECTION     ? CHOLECYSTECTOMY     ? [place] ARTHRODESIS ANT INTERBODY MIN DISCECTOMY,LUMBAR      Description: Lumbar Vertebral Fusion;  Recorded: 2012;  Annotations: Lumbar Fusion L3-L5 in .   ? TX CYSTO/URETERO/PYELOSCOPY, DX      Description: Cystoscopy With Ureteroscopy;  Recorded: 2012;  Annotations: stenting in  for nephrolithiasis.   ? TX GASTRIC BYPASS,OBESE<150CM CASSIA-EN-Y      Description: Gastric Surgery For Morbid Obesity Bypass With Cassia-en-Y;  Recorded: 2013;   ? TX VARGAS W/O FACETEC FORAMOT/[facility]  VRT SEG, CERVICAL      Description: Laminectomy Lumbar;  Recorded: 2012;  Annotations: L5-S1 lami in , and again in  when she re-injured her back.   ? TX TMJ ARTHROSCOPY/DIAGNOSTIC      Description: Arthroscopy Temporomandibular Joint;  Proc Date:  01/01/2003;       Family History   Problem Relation Age of Onset   ? Breast cancer Mother    ? Heart attack Father    ? Hypertension Father    ? Hyperlipidemia Father        Social History     Tobacco Use   ? Smoking status: Current Every Day Smoker     Packs/day: 0.50     Types: Cigarettes   ? Smokeless tobacco: Never Used   Substance Use Topics   ? Alcohol use: Yes   ? Drug use: No       Review of Systems  As above in HPI otherwise negative  Objective:     /62 (Patient Site: Right Arm, Patient Position: Sitting, Cuff Size: Adult Large)   Pulse 87   Temp 98.6  F (37  C) (Oral)   Resp 20   Wt (!) 253 lb 1.6 oz (114.8 kg)   LMP 11/20/2018 (Exact Date)   SpO2 99%   BMI 39.64 kg/m      Physical Exam  General: Patient is resting comfortably no acute distress is afebrile  HEENT: Head is normocephalic atraumatic   eyes are PERRL EOMI sclera anicteric   TMs are clear on the left  Right TM is occluded by cerumen. Cerumen is lavaged and removed and the TM was then visualized and is clear.    Throat is with mild pharyngeal wall erythema and no exudate  No cervical lymphadenopathy present  LUNGS: Clear to auscultation bilaterally  HEART: Regular rate and rhythm  Skin: Without rash non-diaphoretic    Lab:  Recent Results (from the past 72 hour(s))   Rapid Strep A Screen-Throat   Result Value Ref Range    Rapid Strep A Antigen No Group A Strep detected, presumptive negative No Group A Strep detected, presumptive negative   Group A Strep, RNA Direct Detection, Throat   Result Value Ref Range    Group A Strep by PCR No Group A Strep rRNA detected No Group A Strep rRNA detected       Assessment:     Procedures    The primary encounter diagnosis was Throat pain. A diagnosis of Impacted cerumen of right ear was also pertinent to this visit.    Plan:     1. Throat pain  Rapid Strep A Screen-Throat    Group A Strep, RNA Direct Detection, Throat   2. Impacted cerumen of right ear  Nursing communication         Patient  Instructions   Cerumen was removed from right ear.  Use of over-the-counter Tylenol for comfort if you have any difficulty with pain or irritation over the next 2 days.  Return to clinic if you have any redness discharge or pain in the right ear.  Return to primary care provider for reevaluation treatment if any complication or sequela should present.      As a result of our visit today, here are the action plans for you:    1. Medication(s) to stop: There are no discontinued medications.    2. Medication(s) to start or change: No medications were ordered this encounter      3. Other instructions: Yes           Patient Education     Viral Pharyngitis (Sore Throat)    You or your child have pharyngitis (sore throat). This infection is caused by a virus. It can cause throat pain that is worse when swallowing, aching all over, headache, and fever. The infection may be spread by coughing, kissing, or touching others after touching your mouth or nose. Antibiotic medicines do not work against viruses. They are not used for treating this illness.  Home care    If symptoms are severe, you or your child should rest at home. Return to work or school when you or your child feel well enough.     You or your child should drink plenty of fluids to prevent dehydration.    Use throat lozenges or numbing throat sprays to help reduce pain. Gargling with warm salt water will also help reduce throat pain. Dissolve 1/2 teaspoon of salt in 1 glass of warm water. Children can sip on juice or a popsicle. Children 5 years and older can also suck on a lollipop or hard candy.    Dont eat salty or spicy foods or give them to your child. These can be irritating to the throat.  Medicines for a child: You can give your child acetaminophen for fever, fussiness, or discomfort. In babies over 6 months of age, you may use ibuprofen instead of acetaminophen. If your child has chronic liver or kidney disease or ever had a stomach ulcer or GI bleeding,  talk with your cora healthcare provider before giving these medicines. Aspirin should never be used by any child under 18 years of age who has a fever. It may cause severe liver damage.  Medicines for an adult: You may use acetaminophen or ibuprofen to control pain or fever, unless another medicine was prescribed for this. If you have chronic liver or kidney disease or ever had a stomach ulcer or GI bleeding, talk with your healthcare provider before using these medicines.  Follow-up care  Follow up with a healthcare provider or our staff if you or your child are not getting better over the next week.  When to seek medical advice  Call your healthcare provider right away if any of these occur:    Fever as directed by your healthcare provider.  For children, seek care if:  ? Your child is of any age and has repeated fevers above 104 F (40 C).  ? Your child is younger than 2 years of age and has a fever of 100.4 F (38 C) for more than 1 day.  ? Your child is 2 years old or older and has a fever of 100.4 F (38 C) for more than 3 days.    New or worsening ear pain, sinus pain, or headache    Painful lumps in the back of neck    Stiff neck    Lymph nodes are getting larger    Cant swallow liquids, a lot of drooling, or cant open mouth wide due to throat pain    Signs of dehydration, such as very dark urine or no urine, sunken eyes, dizziness    Trouble breathing or noisy breathing    Muffled voice    New rash    Other symptoms are getting worse  Date Last Reviewed: 10/1/2017    5650-0760 [employer]. [address] [place] [address]. All rights reserved. This information is not intended as a substitute for professional medical care. Always follow your healthcare professional's instructions.

## 2021-06-26 NOTE — PROGRESS NOTES
Progress Notes by Candace Beaulieu PA-C at 4/25/2018  9:50 AM     Author: Candace Beaulieu PA-C Service: -- Author Type: Physician Assistant    Filed: 4/25/2018  2:40 PM Encounter Date: 4/25/2018 Status: Signed    : Candace Beaulieu PA-C (Physician Assistant)         Assessment:       Headache  Nausea  Lightheaded  Urinary frequency      Plan:       1. Provided educational materials on hypoglycemia  2. Discussed proper diet and eating regular snacks throughout the day  3. Discussed signs of worsening symptoms and when to be seen immediately if needed.   4. Follow-up with PCP in 72 hours for further evaluation      Patient Instructions     You were seen today for headaches, lightheadedness, and vomiting. This is likely a result of low blood sugars. Recommend follow-up with your primary care provider in the next 72 hours. In the meantime, be sure to eat a banana once a day. Eat regular small snacks in regular intervals throughout the day.    Reasons to seen immediately in the emergency room:  - Fainting spell or loss of consciousness  - Feeling increasingly confused or lightheaded  - Not able to tolerate fluids  - Symptoms do not improve after attempting to increase blood glucose (e.g. drinking fruit juice)  Hypoglycemia (Low Blood Sugar)     Fast-acting sugar includes a cup of nonfat milk.     Too little sugar (glucose) in your blood is called hypoglycemia or low blood sugar. Low blood sugar usually means anything lower than 70 mg/dL. Talk with your healthcare provider about your target range and what level is too low for you. Diabetes itself doesnt cause low blood sugar. But some of the treatments for diabetes, such as pills or insulin, may raise your risk for it. Low blood sugar may cause you to pass out or have a seizure. So always treat low blood sugar right away, but don't overeat.  Special note: Always carry a source of fast-acting sugar and a snack in case of hypoglycemia.   What you may  notice  If you have low blood sugar, you may have one or more of these symptoms:    Shakiness or dizziness    Cold, clammy skin or sweating    Feelings of hunger    Headache    Nervousness    A hard, fast heartbeat    Weakness    Confusion or irritability    Blurred vision    Having nightmares or waking up confused or sweating    Numbness or tingling in the lips or tongue  What you should do  Here are tips to follow if you have hypoglycemia:     First check your blood sugar. If it is too low (out of your target range), eat or drink 15 to 20 grams of fast-acting sugar. This may be 3 to 4 glucose tablets, 4 ounces (half a cup) of fruit juice or regular (nondiet) soda, 8 ounces (1 cup) of fat-free milk, or 1 tablespoon of honey. Dont take more than this, or your blood sugar may go too high.    Wait 15 minutes. Then recheck your blood sugar if you can.    If your blood sugar is still too low, repeat the steps above and check your blood sugar again. If your blood sugar still has not returned to your target range, contact your healthcare provider or seek emergency care.    Once your blood sugar returns to target range, eat a snack or meal.  Preventing low blood sugar  Things you can do include the following:     If your condition needs a strict treatment plan, eat your meals and snacks at the same times each day. Dont skip meals!    If your treatment plan lets you change when you eat and what you eat, learn how to change the time and dose of your rapid-acting insulin to match this.     Ask your healthcare provider if it is safe for you to drink alcohol. Never drink on an empty stomach.    Take your medicine at the prescribed times.    Always carry a source of fast-acting sugar and a snack when youre away from home.  Other things to do  Additional tips include the following:    Carry a medical ID card, a compact USB drive, or wear a medical alert bracelet or necklace. It should say that you have diabetes. It should also  say what to do if you pass out or have a seizure.    Make sure your family, friends, and coworkers know the signs of low blood sugar. Tell them what to do if your blood sugar falls very low and you cant treat yourself.    Keep a glucagon emergency kit handy. Be sure your family, friends, and coworkers know how and when to use it. Check it regularly and replace the glucagon before it expires.    Talk with your health care team about other things you can do to prevent low blood sugar.     If you have unexplained hypoglycemia or hypoglycemia several times, call your healthcare provider.   Date Last Reviewed: 5/1/2016 2000-2016 [employer]. [address] [place] [address], [place] [address]. All rights reserved. This information is not intended as a substitute for professional medical care. Always follow your healthcare professional's instructions.          Subjective:       Amena Renteria is a 42 y.o. female here for evaluation of headaches. Onset of symptoms was 8 days ago. Describes headache as pounding, affecting the whole head, and 7/10 in severity. Bright lights and loud noises make the headache worse. Treatment has included acetaminophen with moderate benefit. Associated symptoms include lightheadedness, ringing in ears, vomiting, polyuria, polydipsia, shaking, and mental fogginess. Patient has family history of type 2 diabetes. Was able to check glucose yesterday using their equipment and had a glucose of 29. She then drank juice and symptoms rapidly improved. History is significant for gastric bypass. She notes gaining 70 pounds over the last 2 years. Previous testing for diabetes has been negative. Patient denies dysuria, hematuria, fevers, loss of consciousness, and history of migraines. She expresses concern for possible pregnancy.    The following portions of the patient's history were reviewed and updated as appropriate: allergies, current medications and problem list.    Review of Systems  Pertinent  items are noted in HPI.     Allergies  Allergies   Allergen Reactions   ? Ketorolac Anaphylaxis     Annotation: Throat swollen     ? Clindamycin Unknown     Stomach pains   ? Iron Unknown     Other reaction(s): Other, see comments  Pt states having numb lips and itching from IV Iron   ? Lithium Carbonate    ? Loperamide    ? Nsaids (Non-Steroidal Anti-Inflammatory Drug)    ? Oxycodone Hives   ? Ropinirole    ? Tramadol    ? Codeine Nausea And Vomiting and Rash     States she can take codeine now as of 8/21/17   ? Lomotil  [Diphenoxylate-Atropine] Rash   ? Sumatriptan Headache and Palpitations     Severe headache         Objective:       Pulse 69  Temp 97  F (36.1  C) (Oral)   Resp 15  Wt (!) 265 lb (120.2 kg)  SpO2 100%  Breastfeeding? No  BMI 41.5 kg/m2  General appearance: alert, appears stated age, cooperative, no distress and non-toxic  Head: Normocephalic, without obvious abnormality, atraumatic  Ears: Left: TM and external ear normal. Right: unable to visualize TM due to cerumen, external ear normal.  Nose: no discharge  Throat: lips, mucosa, and tongue normal; teeth and gums normal  Neck: no adenopathy and supple, symmetrical, trachea midline  Back: no CVA tenderness  Lungs: clear to auscultation bilaterally  Heart: regular rate and rhythm, S1, S2 normal, no murmur, click, rub or gallop  Abdomen: soft, non-tender; bowel sounds normal; no masses,  no organomegaly  Skin: Skin color, texture, turgor normal. No rashes or lesions  Neurologic: Cranial nerves: II: pupils equal, round, reactive to light and accommodation, III,IV,VI: extraocular muscles extra-ocular motions intact, V: facial light touch sensation normal bilaterally, VII: upper facial muscle function normal bilaterally, VII: lower facial muscle function normal bilaterally, VIII: hearing normal, IX: soft palate elevation normal in midline, XI: sternocleidomastoid strength normal bilaterally, XII: tongue strength normal   Motor:grossly  normal  Coordination: normal     Lab Results    Recent Results (from the past 24 hour(s))   Pregnancy (Beta-hCG, Qual), Urine   Result Value Ref Range    Pregnancy Test, Urine Negative Negative    Specific Gravity, UA 1.015 1.001 - 1.030   ISTAT CHEM 7 ([facility] ONLY)   Result Value Ref Range    Sodium 139 136 - 145 mmol/L    Potassium 3.3 (L) 3.5 - 5.5 mmol/L    CO2 20 (L) 22 - 31 mmol/L    Chloride 109 (H) 98 - 107 mmol/L    Anion Gap, Calculation 10 5 - 18 mmol/L    Glucose 107 70 - 125 mg/dL    BUN 6 (L) 8 - 22 mg/dL    Creatinine 0.60 (L) 0.70 - 1.30 mg/dL   HM1 (CBC with Diff)   Result Value Ref Range    WBC 6.9 4.0 - 11.0 thou/uL    RBC 4.27 3.80 - 5.40 mill/uL    Hemoglobin 11.8 (L) 12.0 - 16.0 g/dL    Hematocrit 35.7 35.0 - 47.0 %    MCV 84 80 - 100 fL    MCH 27.6 27.0 - 34.0 pg    MCHC 32.9 32.0 - 36.0 g/dL    RDW 14.3 11.0 - 14.5 %    Platelets 408 140 - 440 thou/uL    MPV 7.7 7.0 - 10.0 fL    Neutrophils % 66 50 - 70 %    Lymphocytes % 26 20 - 40 %    Monocytes % 5 2 - 10 %    Eosinophils % 2 0 - 6 %    Basophils % 0 0 - 2 %    Neutrophils Absolute 4.6 2.0 - 7.7 thou/uL    Lymphocytes Absolute 1.8 0.8 - 4.4 thou/uL    Monocytes Absolute 0.3 0.0 - 0.9 thou/uL    Eosinophils Absolute 0.2 0.0 - 0.4 thou/uL    Basophils Absolute 0.0 0.0 - 0.2 thou/uL   Urinalysis-UC if Indicated   Result Value Ref Range    Color, UA Yellow Colorless, Yellow, Straw, Light Yellow    Clarity, UA Clear Clear    Glucose, UA Negative Negative    Bilirubin, UA Negative Negative    Ketones, UA Negative Negative    Specific Gravity, UA <=1.005 1.005 - 1.030    Blood, UA Negative Negative    pH, UA 6.0 5.0 - 8.0    Protein, UA Negative Negative mg/dL    Urobilinogen, UA 0.2 E.U./dL 0.2 E.U./dL, 1.0 E.U./dL    Nitrite, UA Negative Negative    Leukocytes, UA Negative Negative   Glycosylated Hemoglobin A1c   Result Value Ref Range    Hemoglobin A1c 5.4 3.5 - 6.0 %     I personally reviewed these results and discussed findings with the  patient.

## 2021-06-26 NOTE — PROGRESS NOTES
Progress Notes by Nadeen Sanchez CNP at 5/25/2018 12:50 PM     Author: Nadeen Sanchez CNP Service: -- Author Type: Nurse Practitioner    Filed: 5/25/2018  1:51 PM Encounter Date: 5/25/2018 Status: Signed    : Nadeen Sanchez CNP (Nurse Practitioner)       ASSESSMENT:   1. Epidermoid cyst of skin  sulfamethoxazole-trimethoprim (BACTRIM DS) 800-160 mg per tablet       PLAN:  42-year-old female presents for evaluation of a cyst to the mons pubis.  This is been present for the past 4 months, however up until 3 days ago had not been painful.  It did grow in size over the past 3 days as well.  Exam is consistent with an epidermal cyst, however do question infection as it has grown and become painful over the past several days.  For this reason, she is prescribed a 10 day course of Bactrim.  She will return to clinic with worsening over the next several days to have this reevaluated.  If no improvement with the antibiotics, she will follow-up with primary care for further evaluation of this.    I discussed red flag symptoms, return precautions to clinic/ER and follow up care with patient/parent.  Expected clinical course, symptomatic cares advised. Questions answered. Patient/parent amenable with plan.    Patient Instructions:  Patient Instructions     Please begin taking Bactrim today.  If this worsens over the next 2 days, return for possible drainage.  If no improvement by Tuesday, see your primary care provider for further evaluation.    BACTRIM DISCHARGE INSTRUCTIONS:  You have been prescribed the antibiotic, Bactrim, and will need to take it for the full course as prescribed.      Bactrim will make you sensitive to the sun, so wear sunscreen and avoid prolonged sun exposure while taking this medicine.  Make sure you are drinking enough fluids and water while taking this medicine to prevent risk of kidney stone formation.      Please stop taking this medicine and immediately contact your primary  care provider if you develop any of the following symptoms: skin rash, yellowing of the skin, diarrhea, mouth sores, or abdominal pain.          Understanding Epidermoid Cyst    An epidermoid cyst is a small abnormal growth in the top layers of the skin. It is filled with keratin, the same proteins that make up your hair and nails. These cysts grow slowly. They can grow anywhere on the body. But they are most often found on the face, behind the ears, and on the chest or upper back.  How to say it  [id number]   What causes an epidermoid cyst?  An epidermoid cyst may occur because of an injury to the skin or from acne.  Symptoms of an epidermoid cyst  An epidermoid cyst is a subcutaneous bump. This means it is just below the skin. It may be yellow or skin-colored. It often has a small black alberto in the middle of it, like a blackhead.  An epidermoid cyst rarely causes pain, unless it ruptures or becomes infected. It may ooze keratin, a white, cheesy, smelly material. If the cyst becomes inflamed or infected, it may be:    Bad smelling    Red    Swollen    Tender  Treatment for an epidermoid cyst  Many epidermoid cysts dont cause any problems. They dont necessarily need to be treated. They may go away on their own. But you may want to treat it if you dont like how it looks or it becomes inflamed.  Treatment options include:    Steroid injection. A steroid may be injected into the cyst. This may help ease inflammation. It may also prevent infection.    Surgical removal. The cyst can be cut out. It may also need to be drained first. There is a slight chance the cyst may come back.    Antibiotics. In some cases, you may need to take an oral antibiotic to prevent infection and regrowth.  When to call your healthcare provider  Call your healthcare provider right away if you have any of these:    Fever of 100.4 F (38 C) or higher, or as directed    Redness, swelling, or fluid leaking from your incision that gets  worse    Pain that gets worse    Symptoms that dont get better, or get worse    New symptoms   Date Last Reviewed: 2016-2017 [employer]. [address] [place] [address], [place] [address]. All rights reserved. This information is not intended as a substitute for professional medical care. Always follow your healthcare professional's instructions.            SUBJECTIVE:   Amena Renteria is a 42 y.o. female who presents today with a cyst to the mons pubis for the past 4 months, increasing in size and becoming painful over the past 3 days.  Has not been associated with any fevers, drainage, vaginal discharge, vaginal itching, dysuria, abdominal pain, back pain or any other complaints.      ROS:  Comprehensive 12 pt ROS completed, positives noted in HPI, otherwise negative.      Past Medical History:  Patient Active Problem List   Diagnosis   ? Spinal Adhesive Arachnoiditis   ? Opioid Withdrawal   ? Sacroiliitis   ? Lumbar Radiculopathy   ? Anemia   ? Opioid Dependence With Continuous Use   ? Restless Legs Syndrome   ? Lower Back Pain Chronic   ? Chronic Pain   ? Sciatica   ? Nonepileptic Seizures   ? Lumbar Spondylosis   ? Fatigue   ? Nicotine Dependence   ? Esophageal Reflux   ? Chest Pain   ? Nephrolithiasis   ? Hearing Loss   ? Peripheral Neuropathy   ? Vitamin B12 Deficiency   ? Hypoglycemia   ? Insomnia   ? Anxiety disorder       Surgical History:  Past Surgical History:   Procedure Laterality Date   ? APPENDECTOMY     ?  SECTION     ? CHOLECYSTECTOMY     ? ME ARTHRODESIS ANT INTERBODY MIN DISCECTOMY,LUMBAR      Description: Lumbar Vertebral Fusion;  Recorded: 2012;  Annotations: Lumbar Fusion L3-L5 in .   ? ME CYSTO/URETERO/PYELOSCOPY, DX      Description: Cystoscopy With Ureteroscopy;  Recorded: 2012;  Annotations: stenting in  for nephrolithiasis.   ? ME GASTRIC BYPASS,OBESE<150CM CASSIA-EN-Y      Description: Gastric Surgery For Morbid Obesity Bypass With Cassia-en-Y;   Recorded: 11/27/2013;   ? TX VARGAS W/O FACETEC FORAMOT/[facility] 1/2 VRT SEG, CERVICAL      Description: Laminectomy Lumbar;  Recorded: 03/22/2012;  Annotations: L5-S1 lami in 1998, and again in 2004 when she re-injured her back.   ? TX TMJ ARTHROSCOPY/DIAGNOSTIC      Description: Arthroscopy Temporomandibular Joint;  Proc Date: 01/01/2003;           Family History:  Family History   Problem Relation Age of Onset   ? Breast cancer Mother    ? Heart attack Father    ? Hypertension Father    ? Hyperlipidemia Father        Reviewed; Non-contributory    History   Smoking Status   ? Current Every Day Smoker   ? Packs/day: 0.50   ? Types: Cigarettes   Smokeless Tobacco   ? Never Used       Current Medications:  Current Outpatient Prescriptions on File Prior to Visit   Medication Sig Dispense Refill   ? albuterol (PROAIR HFA;PROVENTIL HFA;VENTOLIN HFA) 90 mcg/actuation inhaler Inhale 2 puffs every 4 (four) hours as needed for wheezing or shortness of breath. 1 each 0   ? albuterol (PROVENTIL HFA;VENTOLIN HFA) 90 mcg/actuation inhaler Inhale 2 puffs every 4 (four) hours as needed for wheezing or shortness of breath (or cough). 18 g 1   ? albuterol (PROVENTIL) 2.5 mg /3 mL (0.083 %) nebulizer solution Take 3 mL (2.5 mg total) by nebulization every 4 (four) hours as needed for wheezing or shortness of breath (or cough). 30 vial 1   ? benzonatate (TESSALON PERLES) 100 MG capsule Take 1 capsule (100 mg total) by mouth every 6 (six) hours as needed for cough. 20 capsule 0   ? bisacodyl (DULCOLAX) 10 mg suppository Insert 10 mg into the rectum.     ? busPIRone (BUSPAR) 30 MG tablet Take 30 mg by mouth 2 (two) times a day.     ? gabapentin (NEURONTIN) 600 MG tablet Take 600 mg by mouth 3 (three) times a day.     ? lurasidone 80 mg Tab Take 60 mg by mouth.      ? methadone (DOLOPHINE) 10 mg/5 mL solution Take 130 mg by mouth daily.     ? omeprazole (PRILOSEC) 40 MG capsule Take 40 mg by mouth daily.     ? ondansetron (ZOFRAN ODT) 4 MG  disintegrating tablet Take 1 tablet (4 mg total) by mouth every 8 (eight) hours as needed for nausea. 6 tablet 0   ? sucralfate (CARAFATE) 100 mg/mL suspension Take 500 mg by mouth 4 (four) times a day.     ? vilazodone (VIIBRYD) 40 mg Tab tablet Take 40 mg by mouth.     ? zolpidem (AMBIEN CR) 12.5 MG CR tablet Take 12.5 mg by mouth.       No current facility-administered medications on file prior to visit.        Allergies:   Allergies   Allergen Reactions   ? Ketorolac Anaphylaxis     Annotation: Throat swollen     ? Clindamycin Unknown     Stomach pains   ? Iron Unknown     Other reaction(s): Other, see comments  Pt states having numb lips and itching from IV Iron   ? Lithium Carbonate    ? Loperamide    ? Nsaids (Non-Steroidal Anti-Inflammatory Drug)    ? Oxycodone Hives   ? Ropinirole    ? Tramadol    ? Codeine Nausea And Vomiting and Rash     States she can take codeine now as of 8/21/17   ? Lomotil  [Diphenoxylate-Atropine] Rash   ? Sumatriptan Headache and Palpitations     Severe headache       OBJECTIVE:   Vitals:    05/25/18 1307   BP: 124/72   Patient Site: Right Arm   Patient Position: Sitting   Cuff Size: Adult Large   Pulse: 71   Resp: 16   Temp: 97.6  F (36.4  C)   TempSrc: Oral   SpO2: 98%   Weight: (!) 263 lb (119.3 kg)     Physical exam reveals a pleasant 42 y.o. female.   Appears healthy, alert and cooperative. Non-toxic appearance.  Lungs: even and unlabored resp  Heart: regular rate  Abdomen: soft, nontender. No masses or organomegaly  Skin: pink, warm, dry.  There is an approximate 2 cm firm mass to the mons pubis.  There is no overlying erythema.  This is movable.  I do not appreciate any fluctuance.  It is tender to palpation.     RADIOLOGY    none  LABORATORY STUDIES    none      Nadeen Sanchez, DELMY

## 2021-06-26 NOTE — PROGRESS NOTES
Progress Notes by Gautam Solo DO at 2018 11:00 AM     Author: Gautam Solo DO Service: -- Author Type: Physician    Filed: 2018 11:16 PM Encounter Date: 2018 Status: Signed    : Gautam Solo DO (Physician)       Chief Complaint   Patient presents with   ? Toe Pain     left toe pain going up the leg- fell last night. slipped on a kids tent        History of Present Illness: Rooming staff notes reviewed.  Chief concern of patient is pain in her left toes.  Main area of pain is left 2nd and 4th toes, with radiation of pain in to plantar surface of foot to mid foot area, and up in to lateral left lower leg.  She has tenderness primarily over the 2nd and 4th toes. When she slipped, her left lower leg went under her.  No knee discomfort.    Review of systems: See history of present illness, otherwise negative.     No current outpatient prescriptions on file.     No current facility-administered medications for this visit.        Past Medical History:   Diagnosis Date   ? Anxiety    ? Chemical dependency (H)    ? Chronic back pain    ? Depression    ? Hx of gastric bypass    ? Kidney stone    ? PTSD (post-traumatic stress disorder)       Past Surgical History:   Procedure Laterality Date   ? APPENDECTOMY     ?  SECTION     ? CHOLECYSTECTOMY     ? [place] ARTHRODESIS ANT INTERBODY MIN DISCECTOMY,LUMBAR      Description: Lumbar Vertebral Fusion;  Recorded: 2012;  Annotations: Lumbar Fusion L3-L5 in .   ? [place] CYSTO/URETERO/PYELOSCOPY, DX      Description: Cystoscopy With Ureteroscopy;  Recorded: 2012;  Annotations: stenting in  for nephrolithiasis.   ? [place] GASTRIC BYPASS,OBESE<150CM CASSIA-EN-Y      Description: Gastric Surgery For Morbid Obesity Bypass With Cassia-en-Y;  Recorded: 2013;   ? [place] VARGAS W/O FACETEC FORAMOT/[facility]  VRT SEG, CERVICAL      Description: Laminectomy Lumbar;  Recorded: 2012;  Annotations: L5-S1 lami in , and again in  when she  re-injured her back.   ? [place] TMJ ARTHROSCOPY/DIAGNOSTIC      Description: Arthroscopy Temporomandibular Joint;  Proc Date: 01/01/2003;      Social History     Social History   ? Marital status: Single     Spouse name: N/A   ? Number of children: 1   ? Years of education: N/A     Social History Main Topics   ? Smoking status: Current Every Day Smoker     Packs/day: 0.50     Types: Cigarettes   ? Smokeless tobacco: Never Used   ? Alcohol use Yes   ? Drug use: No   ? Sexual activity: Yes     Partners: Male     Birth control/ protection: Implant, Injection     Other Topics Concern   ? None     Social History Narrative    Currently staying at a recovery center for drug addiction and alcoholism.       History   Smoking Status   ? Current Every Day Smoker   ? Packs/day: 0.50   ? Types: Cigarettes   Smokeless Tobacco   ? Never Used      Exam:   Blood pressure 110/64, pulse 85, temperature 98  F (36.7  C), weight (!) 251 lb 11.2 oz (114.2 kg), last menstrual period 07/03/2018, SpO2 99 %, not currently breastfeeding.    EXAM:   General: Vital signs reviewed.  Patient is in no acute appearing distress.  Breathing appears nonlabored.  Patient is alert and oriented ×3.  Examination of left lower extremity is notable for moderate edema throughout the second and fourth toes.  No associated discoloration.  No open skin wounds.  She is tender over the left second and fourth toes especially, and to a much lesser extent, her left lateral/distal shin region.  No edema or discoloration noted in her left shin region.  Xray study reviewed by me with patient at time of exam, with no fracture noted by me.  I reviewed the reports with patient at time of exam, and I told her that no fracture was noted, and that some soft tissue edema was noted.  An incidental calcaneal spur was noted which was not associated with her area of discomfort.  Patient noted decreased discomfort in her injured toes with ambulation with the wearing of a postop  shoe.  Assessment/Plan   1. Foot injury, left, initial encounter  XR Foot Left 3 or More VWS    XR Foot Left 3 or More VWS   2. Toe sprain, initial encounter  Shoe post op female    Left 2nd and 4th toes.       Patient Instructions   Use a cold pack to the areas of discomfort, and acetaminophen for pain relief.  Wear the postop shoe to help decrease discomfort while ambulating to help decreased toe flexion while walking.  Elevate the left lower extremity when able to. Be seen again in 3-4 days if symptoms are not better, sooner if feeling any worse.    Toe Sprain  A sprain is a stretching or tearing of the ligaments that hold a joint together. There are no broken bones. Sprains generally take from 3-6 weeks to heal. A toe sprain may be treated by taping the injured toe to the next toe. This is called benny taping. This protects the injured toe and holds it in position. Mild sprains may not need any additional support. If the toenail has been hurt badly, it may fall off in 1-2 weeks. A new one will usually start to grow back within a month.     Home care    Keep your leg elevated when sitting or lying down. This is very important during the first 48 hours to reduce swelling. Stay off the injured foot as much as possible until you can walk on it without pain. If needed, you may use crutches during the first week for this purpose. Crutches can be rented at many pharmacies or surgical/orthopedic supply stores.    You may be given a cast shoe to wear to prevent movement in your toe. If not, you can use a sandal or any shoe that does not put pressure on the injured toe until the swelling and pain go away. If using a sandal, be careful not to hit your foot against anything, since another injury could make the sprain worse.    Apply an ice pack over the injured area for 15 to 20 minutes every 3 to 6 hours. You should do this for the first 24 to 48 hours. You can make an ice pack by filling a plastic bag that seals at the  top with ice cubes and then wrapping it with a thin towel. Continue to use ice packs for relief of pain and swelling as needed. As the ice melts, be careful to avoid getting your wrap, splint, or cast wet. As the ice melts, be careful to avoid getting any wrap, splint, tape, or cast wet. After 48 hours, apply heat from a warm shower or bath for 20 minutes several times daily. Alternating ice and heat may also be helpful.    If buddy tape was applied and it becomes wet or dirty, change it. You may replace it with paper, plastic or cloth tape. Cloth tape and paper tapes must be kept dry. Apply gauze or cotton padding between the toes, especially near webbed area. This will help prevent the skin from getting moist and breaking down. Keep the buddy tape in place for at least 4 weeks, or as advised by your healthcare provider.    You may use over-the-counter pain medicine to control pain, unless another pain medicine was prescribed. If you have chronic liver or kidney disease or ever had a stomach ulcer or GI bleeding, talk with your healthcare provider before using these medicines.    You may return to sports after healing, when you can run without pain.  Follow-up care  Follow up with your with your healthcare provider as advised. Sometimes fractures dont show up on the first X-ray. Bruises and sprains can sometimes hurt as much as a fracture. These injuries can take time to heal completely. If your symptoms dont improve or they get worse, talk with your healthcare provider. You may need a repeat X-ray. If X-rays were taken, you will be told of any new findings that may affect your care.  When to seek medical advice  Call your healthcare provider right away if any of these occur:    Redness, warmth, or fluid drainage from your toe    Pain or swelling increases    Toes become cold, blue, numb, or tingly  Date Last Reviewed: 11/20/2015 2000-2017 [employer]. [address], [place] [address]. All  rights reserved. This information is not intended as a substitute for professional medical care. Always follow your healthcare professional's instructions.           Gautam Solo, DO

## 2021-06-27 NOTE — PROGRESS NOTES
Progress Notes by Desirae Major FNP at 12/26/2018  9:00 AM     Author: Desirae Major FNP Service: -- Author Type: Nurse Practitioner    Filed: 12/26/2018  2:12 PM Encounter Date: 12/26/2018 Status: Signed    : Desirae Major FNP (Nurse Practitioner)       ASSESSMENT:  1. Anxiety  Ambulatory referral to Psychology   2. Severe major depressive disorder (H)     3. High risk medication use         PLAN:    No problem-specific Assessment & Plan notes found for this encounter.    The following are part of a depression follow up plan for the patient:  under care of mental health counselor, completion of mental health crisis plan and management of mental health treatment  Patient Instructions       Patient Education     If you have further questions regarding your plan of care, please call your provider at Phone: [phone]    If you were prescribed medication, be sure to fill your prescription and follow medication directions    If you experience any medication side effects or minor reactions, please contact us at Phone: [phone]    If you or your family member have suicidal thoughts, contact 911 or go to your nearest Emergency Room    [facility] Crisis  Adult [phone]  Child: [phone] [facility]  Adult: [phone]  Child: [phone] [facility]  [facility]   Adult/Child: [phone]   [facility] Crisis  Adult:  [phone]  Child:  [phone]     National Suicide Prevention Line:  1-306.738.8793    [facility]    [address] [place] [address]   [id number]  [phone]      Mobile Crisis Team  [facility]    Adults, 18 and older  COPE- [phone]    Children, ages 17 and younger:  Child Crisis- [phone] Mobile Crisis Team  [facility]    24/7 Mobile Team and Crisis Line  [phone]     Text [place] to [phone] and you will be connected with a counselor who will help defuse the crisis and connect you to local resources.   "Crisis Text Line is available 24 hours a day, 7 days a week.                         Patient Education   Warning Signs of Suicide and What You Can Do  If you think a person could be suicidal, ask, \"Have you thought about suicide?\" If they say \"yes,\" they may already have a plan for how and when they will attempt it. Find out as much as you can. The more detailed the plan, and the easier it is to carry out, the more danger the person is in right now.    Know the warning signs  The warning signs for suicide include:    Threats or talk of suicide    Sense of hopelessness    Buying a gun or other weapon    Statements such as \"Soon, I won't be a problem\" or \"Nothing matters\"    Giving away items they own, making out a will, or planning their     Suddenly being happy or calm after being depressed  Factors that put a person at a higher risk of attempting suicide include:    A history of suicide in the person's family    Previous suicide attempts    Alcohol and drug use, along with impulsive behaviors    Having a diagnose mood disorder such as depression or bipolar disorder    History of trauma or abuse including bullying    Significant losses such as a divorce, death of a loved one, financial problems, or legal problems    Having access to a lethal weapon (for example firearms in the home)    Chronic physical illnesses, including chronic pain    Exposure to suicidal behavior of others  Get help  Don't try to handle this alone. You can be the most help by getting the person to a trained professional. Suicidal thinking may be a sign of depression, a serious but treatable illness.  In an emergency--call 911  Don't leave the person alone. Anyone who is at imminent risk of suicide needs psychiatric services right away. The person must be continuously monitored, and never left out of sight. Call 911 or a 24-hour suicide crisis hotline. It can be found in the white pages of your phone book under \"Suicide.\" You can also " take the person to the nearest hospital emergency room (ER).  Don't keep it a secret and don't wait  Call a mental health clinic or a licensed mental health professional in your area right away: a psychiatrist, clinical psychologist, psychiatric or licensed clinical , marriage and family counselor, or clergy. Tell them you need help for a person who is thinking about suicide.  Resources    National Suicide Prevention Sckpasxg402-207-6541 (160-524-KKIW)www.suicidepreventionlifeline.org    National Suicide Ievyvym539-961-9143 (800-SUICIDE)    National [place] of Mental Hfanuu161-349-2739ybr.[facility].nih.gov    National [place] on Mental Ffkgsja142-871-2575rbv.aleksandr.org    Mental Health Dboexbk664-612-3722jnb.nmha.org   Date Last Reviewed: 1/1/2017 2000-2017 [employer]. [address] [place] [address]. All rights reserved. This information is not intended as a substitute for professional medical care. Always follow your healthcare professional's instructions.       Patient Education   Recognizing Suicide Warning Signs in Yourself    People who are thinking about suicide may not know they are depressed. Certain thoughts, feelings, and actions can be signals that let you know you may need help. The best thing you can do is watch for signs that you may be at risk. Then, ask for help. You can talk to your regular healthcare provider or seek help from a mental health provider.  Depression  Depression is a treatable illness. To know if depression is causing you to feel like ending your life, ask yourself:    Do I feel worthless, guilty, helpless, or hopeless?    Have I been feeling sad, down, or blue on most days?    Have I lost interest in my work or people I used to enjoy?    Do I have trouble sleeping or do I sleep too much?    Do I eat more or less than usual?    Do I feel tired, weak, and low on energy?    Do I feel restless and unable to sit still?    Do I have trouble thinking or  "making choices?    Do I cry more than usual?    Do I feel life isn't worth living?  Warning signs for suicide    Thinking often about taking your life    Planning how you may attempt it    Talking or writing about committing suicide    Feeling that death is the only solution to your problems    Feeling a pressing need to make out your will or arrange your     Giving away things you own    Participating in risky behaviors, such as sex with someone you don't know or drinking and driving    Buying a lethal weapon, such as a gun, or hoarding medicines that could be used in an over dose  If you notice any of these warning signs, call for help right away or go to your closest hospital emergency department. You can also call a mental health clinic or a 24-hour suicide crisis hotline for help and support. Search for local suicide prevention resources on your computer or look for the number in the white pages of your phone book under \"Suicide.\" In an emergency, if you are in immediate risk of harming yourself, call 911. For more information about depression:    National [place] of Mental Ddfwcx490-016-6837zmn.[facility].nih.gov    National Suicide Prevention Olugodim452-351-6263 (799-315-ZYRW)www.suicidepreventionlifeline.org    National [place] on Mental Fedtvxe369-163-6355paw.aleksandr.org    Mental Health Qmuoaup120-596-1867sjo.[facility].org    National Suicide Odelutq264-806-2501 (800-SUICIDE)   Date Last Reviewed: 2017-2017 [employer]. [address] [place] [address]. All rights reserved. This information is not intended as a substitute for professional medical care. Always follow your healthcare professional's instructions.       Patient Education   Using Antidepressants  Depression is a mood disorder that affects the way you think and feel. The most common symptom is a feeling of deep sadness. This feeling does not go away or get better on its own. But most types of depression can be " helped with therapy and antidepressant medicines. (Note: This covers antidepressant use in adults only.)    What do antidepressants do?  Antidepressants restore the balance of certain chemicals in your brain to help ease your depression. You will likely feel better in 4 to 6 weeks. But you may continue taking antidepressants for a year or more to keep your symptoms from coming back. Some people with depression need to take antidepressants for life. There are several types of antidepressants. The main types are described below.  Selective serotonin reuptake inhibitors (SSRIs)  SSRIs are the most effective medicines for the treatment of depression. They tend to have fewer side effects than other antidepressants. Possible side effects include anxiety, trouble sleeping, nausea, diarrhea, sexual dysfunction, and headaches. In rare cases, they may make you more depressed. SSRIs shouldnt be mixed with certain other medicines. Talk with your healthcare provider about all the medicines, herbs, and supplements you are taking.  Tricyclic antidepressants  Tricyclics help severe or long-term depression. They have been used for many years with good results. Possible side effects include blurred vision, dry mouth, and constipation.  Monoamine oxidase inhibitors (MAOIs)  If you dont respond to tricyclics or SSRIs, your healthcare provider may prescribe MAOIs. These medicines can be very effective. But people taking MAOIs must stay away from certain foods and medicines. Your healthcare provider can tell you more.  Lithium  If you have bipolar disorder, you may take a medicine called lithium. This medicine helps even out your mood. Possible side effects are weight gain, trembling, loose stool, and nausea. Lithium is also used:    For people who have unipolar depression and have not responded to other antidepressants    For people who have a sudden (acute) episode of unipolar depression    As a maintenance medicine to prevent unipolar  depression from happening again  Things to avoid if you are taking MAOIs  If you are taking MAOIs, dont take any of the following:    Beans    Aged cheese    Chocolate    Red wine    Most cold medicines    Certain medicines (ask your healthcare provider)   To reduce the risk of lithium poisoning  You can reduce the risk of lithium poisoning by following this advice:    Take only the prescribed amount of lithium. If your depressive symptoms get worse, contact your healthcare provider. Never increase your medicine on your own.    Drink plenty of fluids other than coffee, tea, and soda.    Limit salt in your diet.    Before using other prescribed medicines or over-the-counter medicines, check with your pharmacist. This is to be sure the medicines wont interact with the lithium.    Never share your medicines or use another person's medicines, even if it is the same medicine and dose.   If you have side effects  The side effects of antidepressants are usually mild. But if you have troubling side effects, call your healthcare provider. Changing the dosage or type of medicine may help. Never stop taking medicines on your own.  Date Last Reviewed: 5/1/2017 2000-2017 [employer]. [address] [place] [address]. All rights reserved. This information is not intended as a substitute for professional medical care. Always follow your healthcare professional's instructions.       Patient Education   Depression: Tips to Help Yourself    As your healthcare providers help treat your depression, you can also help yourself. Keep in mind that your illness affects you emotionally, physically, mentally, and socially. So full recovery will take time. Take care of your body and your soul, and be patient with yourself as you get better.  Self-care    Educate yourself. Read about treatment and medicine options. If you have the energy, attend local conferences or support groups. Keep a list of useful websites and  helpful books and use them as needed. This illness is not your fault. Dont blame yourself for your depression.    Manage early symptoms. If you notice symptoms returning, experience triggers, or identify other factors that may lead to a depressive episode, get help as soon as possible. Ask trusted friends and family to monitor your behavior and let you know if they see anything of concern.    Work with your provider. Find a provider you can trust. Communicate honestly with that person and share information on your treatment for depression and your reaction to medicines.    Be prepared for a crisis. Know what to do if you experience a crisis. Keep the phone number of a crisis hotline and know the location of your community's urgent care centers and the closest emergency department.    Hold off on big decisions. Depression can cloud your judgment. So wait until you feel better before making major life decisions, such as changing jobs, moving, or getting  or .    Be patient. Recovering from depression is a process. Dont be discouraged if it takes some time to feel better.    Keep it simple. Depression saps your energy and concentration. So you wont be able to do all the things you used to do. Set small goals and do what you can.    Be with others. Dont isolate yourself--youll only feel worse. Try to be with other people. And take part in fun activities when you can. Go to a movie, ballgame, [employer] service, or social event. Talk openly with people you can trust. And accept help when its offered.  Take care of your body  People with depression often lose the desire to take care of themselves. That only makes their problems worse. During treatment and afterward, make a point to:    Exercise. Its a great way to take care of your body. And studies have shown that exercise helps fight depression.    Avoid drugs and alcohol. These may ease the pain in the short term. But theyll only make your problems worse  in the long run.    Get relief from stress. Ask your healthcare provider for relaxation exercises and techniques to help relieve stress.    Eat right. A balanced and healthy diet helps keep your body healthy.  Date Last Reviewed: 1/1/2017 2000-2017 [employer]. [address] [place] [address]. All rights reserved. This information is not intended as a substitute for professional medical care. Always follow your healthcare professional's instructions.       Patient Education   Depression Affects Your Mind and Body  Everyone feels sad or blue from time to time for a few days or weeks. Depression is when these feelings don't go away and they interfere with daily life.  Depression is a real illness that can develop at any age. It is one of the most common mental health problems in the [place] Depression makes you feel sad, helpless, and hopeless. It gets in the way of your life and relationships. It inhibits your ability to think and act. But, with help, you can feel better again.     When I was depressed, I felt awful. I was so tired all the time I could hardly think, but at night I couldnt fall asleep. My head hurt. My stomach hurt. I didnt know what was wrong with me.   Depression affects your whole body  Brain chemicals affect your body as well as your mood. So depression may do more than just make you feel low. You may also feel bad physically. Depression can:    Cause trouble with mental tasks such as remembering, concentrating, or making decisions    Make you feel nervous and jumpy    Cause trouble sleeping. Or you may sleep too much    Change your appetite    Cause headaches, stomachaches, or other aches and pains    Drain your body of energy  Depression and other illness  It is common for people who have chronic health problems to also have depression. It can often be hard to tell which one caused the other. A person might become depressed after finding out they have a health problem.  But some studies suggest being depressed may make certain health problems more likely. And some depressed people stop taking care of themselves. This may make them more likely to get sick.  Date Last Reviewed: 1/1/2017 2000-2017 [employer]. [address] [place] [address], [place] [address]. All rights reserved. This information is not intended as a substitute for professional medical care. Always follow your healthcare professional's instructions.             Orders Placed This Encounter   Procedures   ? Ambulatory referral to Psychology     Referral Priority:   Routine     Referral Type:   Behavioral Health     Referral Reason:   Evaluation and Treatment     Number of Visits Requested:   1     Medications Discontinued During This Encounter   Medication Reason   ? escitalopram oxalate (LEXAPRO) 20 MG tablet Reorder       No Follow-up on file.    CHIEF COMPLAINT:  Chief Complaint   Patient presents with   ? Anxiety     having painic attacks was seen in the ED 12/25 crisis eval        HISTORY OF PRESENT ILLNESS:  Amena is a 43 y.o. female Patient with recent crisis visit to the emergency department.  Patient caught her significant other with another person on [employer] porter.  She presented to the emergency room in crisis unable to eat or take having vomiting and diarrhea.  She is here today to discuss medication changes.  Did not bring bottles for pill counts today.  Discussed that given her symptoms I will give her very limited quantity of Xanax since that has worked well for her but if she is requiring more at the next visit she will need to complete a controlled substance agreement and bring pills in for pill count.  They did do a urine drug screen at the emergency department and what was done at the previous visit with Dr. Sorensen.  Discussed the importance of seeing a consistent provider ideally her primary for continued management of these controlled substances.  Patient demonstrates understanding.   Discussed that we will increase Lexapro today and try to get her in more urgently with psychology or psychiatry.  Had a moshe discussion about benzodiazepines, but we cannot continue to prescribe from multiple different physicians.  We did review  during the visit.  She has an upcoming appointment on February 4 but this is too far out given current symptoms.  No suicidal ideations today, patient has feels very depressed and anxious is several times a day.    REVIEW OF SYSTEMS:        All other systems are negative  PFSH:  Reviewed, no changes      TOBACCO USE:  Social History     Tobacco Use   Smoking Status Current Every Day Smoker   ? Packs/day: 0.50   ? Types: Cigarettes   Smokeless Tobacco Never Used       VITALS:  Vitals:    12/26/18 0904   BP: 138/73   Patient Site: Left Arm   Patient Position: Sitting   Cuff Size: Adult Large   Pulse: 96     Wt Readings from Last 3 Encounters:   12/25/18 (!) 254 lb (115.2 kg)   12/20/18 (!) 256 lb 6.4 oz (116.3 kg)   12/18/18 (!) 253 lb (114.8 kg)       PHYSICAL EXAM:   /73 (Patient Site: Left Arm, Patient Position: Sitting, Cuff Size: Adult Large)   Pulse 96   LMP 12/10/2018 (Within Days)   General appearance: alert, appears stated age and cooperative  Neurologic: Grossly normal  Psychologic: Mood and affect normal.      DATA REVIEWED:  Additional History from Old Records Summarized (2): Dr. Sorensen, ED visits  Decision to Obtain Records (1):  reviewed.  Radiology Tests Summarized or Ordered (1): 0  Labs Reviewed or Ordered (1): 0  Medicine Test Summarized or Ordered (1): 0  Independent Review of EKG or X-RAY(2 each): 0    The visit lasted a total of 25 minutes face to face with the patient. Over 50% of the time was spent counseling and educating the patient about plan of care.    MEDICATIONS:  Current Outpatient Medications   Medication Sig Dispense Refill   ? escitalopram oxalate (LEXAPRO) 20 MG tablet Take 1.5 tablets (30 mg total) by mouth daily. 45 tablet  2   ? hydrOXYzine pamoate (VISTARIL) 25 MG capsule Take 1-2 capsules (25-50 mg total) by mouth every 6 (six) hours as needed for itching or anxiety. 30 capsule 0   ? multivitamin therapeutic tablet Take 1 tablet by mouth daily.     ? ALPRAZolam (XANAX) 0.5 MG tablet Take 1 tablet (0.5 mg total) by mouth 3 (three) times a day as needed for sleep or anxiety. 10 tablet 0   ? EPINEPHrine (EPIPEN) 0.3 mg/0.3 mL injection Inject 0.3 mL (0.3 mg total) as directed as needed for anaphylaxis Inject into thigh.. 2 Pre-filled Pen Syringe 0   ? ondansetron (ZOFRAN ODT) 4 MG disintegrating tablet Take 1 tablet (4 mg total) by mouth every 8 (eight) hours as needed for nausea. 10 tablet 0     No current facility-administered medications for this visit.        This note has been dictated using voice recognition software. Any grammatical or context distortions are unintentional and inherent to the software

## 2021-07-03 NOTE — ADDENDUM NOTE
Addendum Note by Millie Hernandez PA-C at 12/7/2018 11:14 AM     Author: Millie Hernandez PA-C Service: -- Author Type: Physician Assistant    Filed: 12/7/2018 11:14 AM Encounter Date: 12/6/2018 Status: Signed    : Millie Hernandez PA-C (Physician Assistant)    Addended by: MILLIE HERNANDEZ on: 12/7/2018 11:14 AM        Modules accepted: Orders

## 2021-07-03 NOTE — ADDENDUM NOTE
Addendum Note by Desirae Vaca FNP at 12/18/2018 10:20 AM     Author: Desirae Vaca FNP Service: -- Author Type: Nurse Practitioner    Filed: 12/18/2018  1:28 PM Encounter Date: 12/18/2018 Status: Signed    : Desirae Vaca FNP (Nurse Practitioner)    Addended by: DESIRAE VACA on: 12/18/2018 01:28 PM        Modules accepted: Orders

## 2021-07-03 NOTE — ADDENDUM NOTE
Addendum Note by Millie Hernandez PA-C at 12/6/2018  3:17 PM     Author: Millie Hernandez PA-C Service: -- Author Type: Physician Assistant    Filed: 12/6/2018  3:17 PM Encounter Date: 12/5/2018 Status: Signed    : Millie Hernandez PA-C (Physician Assistant)    Addended by: MILLIE HERNANDEZ on: 12/6/2018 03:17 PM        Modules accepted: Orders

## 2021-07-03 NOTE — ADDENDUM NOTE
Addendum Note by Desirae Vaca FNP at 1/31/2019  2:44 PM     Author: Desirae Vaca FNP Service: -- Author Type: Nurse Practitioner    Filed: 1/31/2019  2:44 PM Encounter Date: 1/30/2019 Status: Signed    : Desirae Vaca FNP (Nurse Practitioner)    Addended by: DESIRAE VACA on: 1/31/2019 02:44 PM        Modules accepted: Orders

## 2021-07-03 NOTE — ADDENDUM NOTE
Addendum Note by Amena Perez CMA at 1/31/2019  1:59 PM     Author: Amena Perez CMA Service: -- Author Type: Certified Medical Assistant    Filed: 1/31/2019  1:59 PM Encounter Date: 1/30/2019 Status: Signed    : Amena Perez CMA (Certified Medical Assistant)    Addended by: AMENA PEREZ on: 1/31/2019 01:59 PM        Modules accepted: Orders